# Patient Record
Sex: MALE | Race: BLACK OR AFRICAN AMERICAN | NOT HISPANIC OR LATINO | URBAN - METROPOLITAN AREA
[De-identification: names, ages, dates, MRNs, and addresses within clinical notes are randomized per-mention and may not be internally consistent; named-entity substitution may affect disease eponyms.]

---

## 2021-12-15 ENCOUNTER — INPATIENT (INPATIENT)
Facility: HOSPITAL | Age: 51
LOS: 1 days | Discharge: HOME | End: 2021-12-17
Attending: HOSPITALIST | Admitting: HOSPITALIST
Payer: MEDICAID

## 2021-12-15 VITALS
HEART RATE: 109 BPM | RESPIRATION RATE: 20 BRPM | TEMPERATURE: 98 F | SYSTOLIC BLOOD PRESSURE: 136 MMHG | DIASTOLIC BLOOD PRESSURE: 100 MMHG | OXYGEN SATURATION: 96 %

## 2021-12-15 DIAGNOSIS — K52.9 NONINFECTIVE GASTROENTERITIS AND COLITIS, UNSPECIFIED: ICD-10-CM

## 2021-12-15 LAB
ALBUMIN SERPL ELPH-MCNC: 4.2 G/DL — SIGNIFICANT CHANGE UP (ref 3.3–5)
ALBUMIN SERPL ELPH-MCNC: 4.8 G/DL — SIGNIFICANT CHANGE UP (ref 3.3–5)
ALP SERPL-CCNC: 103 U/L — SIGNIFICANT CHANGE UP (ref 40–120)
ALP SERPL-CCNC: 88 U/L — SIGNIFICANT CHANGE UP (ref 40–120)
ALT FLD-CCNC: 25 U/L — SIGNIFICANT CHANGE UP (ref 4–41)
ALT FLD-CCNC: 29 U/L — SIGNIFICANT CHANGE UP (ref 4–41)
ANION GAP SERPL CALC-SCNC: 14 MMOL/L — SIGNIFICANT CHANGE UP (ref 7–14)
ANION GAP SERPL CALC-SCNC: 17 MMOL/L — HIGH (ref 7–14)
ANISOCYTOSIS BLD QL: SLIGHT — SIGNIFICANT CHANGE UP
APTT BLD: 39.4 SEC — HIGH (ref 27–36.3)
AST SERPL-CCNC: 20 U/L — SIGNIFICANT CHANGE UP (ref 4–40)
AST SERPL-CCNC: 27 U/L — SIGNIFICANT CHANGE UP (ref 4–40)
BASE EXCESS BLDV CALC-SCNC: -15 MMOL/L — LOW (ref -2–3)
BASOPHILS # BLD AUTO: 0.1 K/UL — SIGNIFICANT CHANGE UP (ref 0–0.2)
BASOPHILS NFR BLD AUTO: 1.2 % — SIGNIFICANT CHANGE UP (ref 0–2)
BILIRUB SERPL-MCNC: 0.3 MG/DL — SIGNIFICANT CHANGE UP (ref 0.2–1.2)
BILIRUB SERPL-MCNC: 0.3 MG/DL — SIGNIFICANT CHANGE UP (ref 0.2–1.2)
BLOOD GAS VENOUS COMPREHENSIVE RESULT: SIGNIFICANT CHANGE UP
BUN SERPL-MCNC: 31 MG/DL — HIGH (ref 7–23)
BUN SERPL-MCNC: 39 MG/DL — HIGH (ref 7–23)
CALCIUM SERPL-MCNC: 8.6 MG/DL — SIGNIFICANT CHANGE UP (ref 8.4–10.5)
CALCIUM SERPL-MCNC: 8.8 MG/DL — SIGNIFICANT CHANGE UP (ref 8.4–10.5)
CHLORIDE BLDV-SCNC: 104 MMOL/L — SIGNIFICANT CHANGE UP (ref 96–108)
CHLORIDE SERPL-SCNC: 101 MMOL/L — SIGNIFICANT CHANGE UP (ref 98–107)
CHLORIDE SERPL-SCNC: 104 MMOL/L — SIGNIFICANT CHANGE UP (ref 98–107)
CO2 BLDV-SCNC: 13.9 MMOL/L — LOW (ref 22–26)
CO2 SERPL-SCNC: 10 MMOL/L — CRITICAL LOW (ref 22–31)
CO2 SERPL-SCNC: 18 MMOL/L — LOW (ref 22–31)
CREAT SERPL-MCNC: 1.62 MG/DL — HIGH (ref 0.5–1.3)
CREAT SERPL-MCNC: 1.89 MG/DL — HIGH (ref 0.5–1.3)
EOSINOPHIL # BLD AUTO: 0 K/UL — SIGNIFICANT CHANGE UP (ref 0–0.5)
EOSINOPHIL NFR BLD AUTO: 0 % — SIGNIFICANT CHANGE UP (ref 0–6)
GAS PNL BLDV: 129 MMOL/L — LOW (ref 136–145)
GAS PNL BLDV: SIGNIFICANT CHANGE UP
GIANT PLATELETS BLD QL SMEAR: PRESENT — SIGNIFICANT CHANGE UP
GLUCOSE BLDV-MCNC: 126 MG/DL — HIGH (ref 70–99)
GLUCOSE SERPL-MCNC: 128 MG/DL — HIGH (ref 70–99)
GLUCOSE SERPL-MCNC: 133 MG/DL — HIGH (ref 70–99)
HCO3 BLDV-SCNC: 13 MMOL/L — LOW (ref 22–29)
HCT VFR BLD CALC: 46.7 % — SIGNIFICANT CHANGE UP (ref 39–50)
HCT VFR BLD CALC: 51.3 % — HIGH (ref 39–50)
HCT VFR BLDA CALC: 51 % — SIGNIFICANT CHANGE UP (ref 39–51)
HGB BLD CALC-MCNC: 16.9 G/DL — SIGNIFICANT CHANGE UP (ref 13–17)
HGB BLD-MCNC: 15.3 G/DL — SIGNIFICANT CHANGE UP (ref 13–17)
HGB BLD-MCNC: 16.3 G/DL — SIGNIFICANT CHANGE UP (ref 13–17)
HIV 1+2 AB+HIV1 P24 AG SERPL QL IA: SIGNIFICANT CHANGE UP
IANC: 4.22 K/UL — SIGNIFICANT CHANGE UP (ref 1.5–8.5)
INR BLD: 1.35 RATIO — HIGH (ref 0.88–1.16)
LACTATE BLDV-MCNC: 1.7 MMOL/L — SIGNIFICANT CHANGE UP (ref 0.5–2)
LYMPHOCYTES # BLD AUTO: 0.74 K/UL — LOW (ref 1–3.3)
LYMPHOCYTES # BLD AUTO: 9.2 % — LOW (ref 13–44)
MAGNESIUM SERPL-MCNC: 2.1 MG/DL — SIGNIFICANT CHANGE UP (ref 1.6–2.6)
MANUAL SMEAR VERIFICATION: SIGNIFICANT CHANGE UP
MCHC RBC-ENTMCNC: 23.7 PG — LOW (ref 27–34)
MCHC RBC-ENTMCNC: 24.1 PG — LOW (ref 27–34)
MCHC RBC-ENTMCNC: 31.8 GM/DL — LOW (ref 32–36)
MCHC RBC-ENTMCNC: 32.8 GM/DL — SIGNIFICANT CHANGE UP (ref 32–36)
MCV RBC AUTO: 73.4 FL — LOW (ref 80–100)
MCV RBC AUTO: 74.5 FL — LOW (ref 80–100)
MONOCYTES # BLD AUTO: 1.11 K/UL — HIGH (ref 0–0.9)
MONOCYTES NFR BLD AUTO: 13.8 % — SIGNIFICANT CHANGE UP (ref 2–14)
NEUTROPHILS # BLD AUTO: 4.33 K/UL — SIGNIFICANT CHANGE UP (ref 1.8–7.4)
NEUTROPHILS NFR BLD AUTO: 54 % — SIGNIFICANT CHANGE UP (ref 43–77)
NRBC # BLD: 0 /100 WBCS — SIGNIFICANT CHANGE UP
NRBC # FLD: 0 K/UL — SIGNIFICANT CHANGE UP
PCO2 BLDV: 36 MMHG — LOW (ref 42–55)
PH BLDV: 7.16 — LOW (ref 7.32–7.43)
PLAT MORPH BLD: NORMAL — SIGNIFICANT CHANGE UP
PLATELET # BLD AUTO: 318 K/UL — SIGNIFICANT CHANGE UP (ref 150–400)
PLATELET # BLD AUTO: 364 K/UL — SIGNIFICANT CHANGE UP (ref 150–400)
PLATELET COUNT - ESTIMATE: NORMAL — SIGNIFICANT CHANGE UP
PO2 BLDV: 44 MMHG — SIGNIFICANT CHANGE UP
POIKILOCYTOSIS BLD QL AUTO: SLIGHT — SIGNIFICANT CHANGE UP
POLYCHROMASIA BLD QL SMEAR: SLIGHT — SIGNIFICANT CHANGE UP
POTASSIUM BLDV-SCNC: 3.8 MMOL/L — SIGNIFICANT CHANGE UP (ref 3.5–5.1)
POTASSIUM SERPL-MCNC: 3.9 MMOL/L — SIGNIFICANT CHANGE UP (ref 3.5–5.3)
POTASSIUM SERPL-MCNC: 4 MMOL/L — SIGNIFICANT CHANGE UP (ref 3.5–5.3)
POTASSIUM SERPL-SCNC: 3.9 MMOL/L — SIGNIFICANT CHANGE UP (ref 3.5–5.3)
POTASSIUM SERPL-SCNC: 4 MMOL/L — SIGNIFICANT CHANGE UP (ref 3.5–5.3)
PROT SERPL-MCNC: 7.3 G/DL — SIGNIFICANT CHANGE UP (ref 6–8.3)
PROT SERPL-MCNC: 8.9 G/DL — HIGH (ref 6–8.3)
PROTHROM AB SERPL-ACNC: 15.3 SEC — HIGH (ref 10.6–13.6)
RBC # BLD: 6.36 M/UL — HIGH (ref 4.2–5.8)
RBC # BLD: 6.89 M/UL — HIGH (ref 4.2–5.8)
RBC # FLD: 17.9 % — HIGH (ref 10.3–14.5)
RBC # FLD: 18.2 % — HIGH (ref 10.3–14.5)
RBC BLD AUTO: ABNORMAL
SAO2 % BLDV: 77.6 % — SIGNIFICANT CHANGE UP
SARS-COV-2 RNA SPEC QL NAA+PROBE: SIGNIFICANT CHANGE UP
SMUDGE CELLS # BLD: PRESENT — SIGNIFICANT CHANGE UP
SODIUM SERPL-SCNC: 131 MMOL/L — LOW (ref 135–145)
SODIUM SERPL-SCNC: 133 MMOL/L — LOW (ref 135–145)
VARIANT LYMPHS # BLD: 21.8 % — HIGH (ref 0–6)
WBC # BLD: 6.71 K/UL — SIGNIFICANT CHANGE UP (ref 3.8–10.5)
WBC # BLD: 8.01 K/UL — SIGNIFICANT CHANGE UP (ref 3.8–10.5)
WBC # FLD AUTO: 6.71 K/UL — SIGNIFICANT CHANGE UP (ref 3.8–10.5)
WBC # FLD AUTO: 8.01 K/UL — SIGNIFICANT CHANGE UP (ref 3.8–10.5)

## 2021-12-15 PROCEDURE — 99291 CRITICAL CARE FIRST HOUR: CPT

## 2021-12-15 PROCEDURE — 99223 1ST HOSP IP/OBS HIGH 75: CPT

## 2021-12-15 RX ORDER — CARVEDILOL PHOSPHATE 80 MG/1
1 CAPSULE, EXTENDED RELEASE ORAL
Qty: 0 | Refills: 0 | DISCHARGE

## 2021-12-15 RX ORDER — ATORVASTATIN CALCIUM 80 MG/1
20 TABLET, FILM COATED ORAL AT BEDTIME
Refills: 0 | Status: DISCONTINUED | OUTPATIENT
Start: 2021-12-15 | End: 2021-12-17

## 2021-12-15 RX ORDER — ACETAMINOPHEN 500 MG
650 TABLET ORAL EVERY 6 HOURS
Refills: 0 | Status: DISCONTINUED | OUTPATIENT
Start: 2021-12-15 | End: 2021-12-17

## 2021-12-15 RX ORDER — SODIUM CHLORIDE 9 MG/ML
1000 INJECTION INTRAMUSCULAR; INTRAVENOUS; SUBCUTANEOUS ONCE
Refills: 0 | Status: COMPLETED | OUTPATIENT
Start: 2021-12-15 | End: 2021-12-15

## 2021-12-15 RX ORDER — SODIUM CHLORIDE 9 MG/ML
500 INJECTION INTRAMUSCULAR; INTRAVENOUS; SUBCUTANEOUS ONCE
Refills: 0 | Status: COMPLETED | OUTPATIENT
Start: 2021-12-15 | End: 2021-12-15

## 2021-12-15 RX ORDER — ROSUVASTATIN CALCIUM 5 MG/1
1 TABLET ORAL
Qty: 0 | Refills: 0 | DISCHARGE

## 2021-12-15 RX ORDER — SPIRONOLACTONE 25 MG/1
1 TABLET, FILM COATED ORAL
Qty: 0 | Refills: 0 | DISCHARGE

## 2021-12-15 RX ORDER — ONDANSETRON 8 MG/1
4 TABLET, FILM COATED ORAL EVERY 8 HOURS
Refills: 0 | Status: DISCONTINUED | OUTPATIENT
Start: 2021-12-15 | End: 2021-12-17

## 2021-12-15 RX ORDER — LISINOPRIL 2.5 MG/1
1 TABLET ORAL
Qty: 0 | Refills: 0 | DISCHARGE

## 2021-12-15 RX ORDER — SODIUM BICARBONATE 1 MEQ/ML
0.23 SYRINGE (ML) INTRAVENOUS
Qty: 150 | Refills: 0 | Status: DISCONTINUED | OUTPATIENT
Start: 2021-12-15 | End: 2021-12-16

## 2021-12-15 RX ADMIN — Medication 150 MEQ/KG/HR: at 22:09

## 2021-12-15 RX ADMIN — Medication 150 MEQ/KG/HR: at 08:43

## 2021-12-15 RX ADMIN — SODIUM CHLORIDE 500 MILLILITER(S): 9 INJECTION INTRAMUSCULAR; INTRAVENOUS; SUBCUTANEOUS at 05:21

## 2021-12-15 RX ADMIN — SODIUM CHLORIDE 1000 MILLILITER(S): 9 INJECTION INTRAMUSCULAR; INTRAVENOUS; SUBCUTANEOUS at 07:14

## 2021-12-15 RX ADMIN — ATORVASTATIN CALCIUM 20 MILLIGRAM(S): 80 TABLET, FILM COATED ORAL at 21:25

## 2021-12-15 NOTE — ED ADULT NURSE REASSESSMENT NOTE - NS ED NURSE REASSESS COMMENT FT1
Pt received from night shift RN. Pt A/Ox4. Pt resting comfortably in stretcher. Respirations even and unlabored. Pt has not had an episode of diarrhea since admission. Abdomen nontender, nondistended, active bowel sounds heard on all quadrants.  Pt denies abdominal pain. Vital sings reassessed as noted. Awaiting stool sample. Will continue to monitor. Pt received from night shift RN. Pt A/Ox4. Pt resting comfortably in stretcher. Respirations even and unlabored. Pt has not had an episode of diarrhea since admission. Abdomen nontender, nondistended, active bowel sounds heard on all quadrants.  Pt denies abdominal pain. 20G IV placed on RAC. Vital signs reassessed as noted. Medications gives per MD order. Awaiting stool sample. Will continue to monitor. Pt received from night shift RN. Pt A/Ox4. Pt resting comfortably in stretcher. Respirations even and unlabored. Pt has not had an episode of diarrhea since admission. Abdomen nontender, nondistended, active bowel sounds heard on all quadrants.  Pt denies abdominal pain. 20G IV placed on RAC. Vital signs reassessed as noted. Medications given per MD order. Awaiting stool sample. Will continue to monitor.

## 2021-12-15 NOTE — H&P ADULT - ASSESSMENT
51yoM p/w diarrhea x 3 days, liquid stools, over 10 per day. PMH notable for a hospitalization in 2018 for pneumonia,; HTN, morbid obesity. He has also been told that he has heart failure, however he is unclear about this-- states he saw his Cardiologist 2 months ago and was told that he does not have HF. Currently takes coreg 25 mg BID, spironolactone 25 mg daily, lisinopril 40 mg daily which he has been adherent with prior to admission. Found to have high AG metabolic acidosis believed secondary to GI losses of bicarb in setting of diarrhea and was started on a bicarb gtt. GIP/ c diff pending.     # AG metabolic acidosis   # Diarrhea  - F/u repeat BMP, mag, CBC  - Cont bicarb gtt at 125cc/hr while awaiting labs  - Infectious work up pending: c.diff/GIP/covid/HIV    # HTN  With possible HFpEF? Patient doesn't know his exact diagnosis.  - Hold lisinopril/coreg in the setting of hypovolemia due to acute diarrhea  - Hold spironolactone in setting of diarrhea and electrolyte derangements  - Can likely resume these meds in am    # HLD  - Cont rosuvastatin 5 mg daily    # Not vaccinated against COVID19  Attempted to  patient on importance of COVID vaccination. Needs to be readdressed on discharge as he has multiple high risk factors (obesity, HTN) and lives with roommates.    # PPX  - SCDs only

## 2021-12-15 NOTE — H&P ADULT - NSHPLABSRESULTS_GEN_ALL_CORE
LABS:                        16.3   8.01  )-----------( 364      ( 15 Dec 2021 05:58 )             51.3     12-15    131<L>  |  104  |  39<H>  ----------------------------<  133<H>  4.0   |  10<LL>  |  1.89<H>    Ca    8.8      15 Dec 2021 05:58    TPro  8.9<H>  /  Alb  4.8  /  TBili  0.3  /  DBili  x   /  AST  27  /  ALT  29  /  AlkPhos  103  12-15              RADIOLOGY & ADDITIONAL TESTS:  no imaging done in ED

## 2021-12-15 NOTE — ED PROVIDER NOTE - OBJECTIVE STATEMENT
Patient is a 50 y/o M with PMHx of HTN who presents to the ED with 3 days of diarrhea. He states that since Sunday, he has been experiencing 10 loose bowel movements per day. Initially, his BM were formed but became loose and orange in color. Starting Monday, he endorses having black loose stools consistent with melena. Yesterday, he had two episodes on NBNB vomiting and mild left upper quadrant abdominal tenderness. He has had decreased PO intake, although he can tolerate food and liquids. He denies recent antibiotic use. He denies CP, SOB, fever, chills, nausea,  changes, sick contacts, or recent changes in diet. He took Pepto-Bismol which did not alleviate his symptoms.

## 2021-12-15 NOTE — H&P ADULT - HISTORY OF PRESENT ILLNESS
51yoM p/w diarrhea x 3 days, liquid stools, over 10 per day. PMH notable for a hospitalization in 2018 for pneumonia,; HTN, morbid obesity. He has also been told that he has heart failure, however he is unclear about this-- states he saw his Cardiologist 2 months ago and was told that he does not have HF. Currently takes coreg 25 mg BID, spironalactone 25 mg daily, lisinopril 40 mg daily which he has been adherent with. Denies any fevers, chills, sweats, weight loss/gain, chest pain, SOB, RAMIREZ, dysuria, hematuria, frothy urine, abdominal pain. Has been taking peptobismol  the past 2 days and since he started doing this he has noted that his stools have been black. Up to that point they were yellow brown. Had 1-12 episodes of vomiting that has since resolved. No stools since he has been in the ED. No recent hx of antibiotic use. Has NOT been vaccinated against covid 19- doesn't believe that the vaccine is helping anyone. "People still get covid even if they've been vaccinated."    ED vitals: T 97.8F, , /100, RR 20, 96% on RA    CBC notable for WBC 8k,  hgb 16.3, MCV 74.5, w mild lymphopenia.  COVID pcr pending. . K 4, chl 104, Co2 10, AG 17, BUn 39, Cr 1.89, gluc 133. alk phos 103, ast 27, alt 29. ph 7.16, pco2 36, po2 44, hco3 13. lactate 1.7    Recieved 2L NS boluses and was started on a bicarb gtt.

## 2021-12-15 NOTE — ED ADULT TRIAGE NOTE - CHIEF COMPLAINT QUOTE
Pt is c/o  several episodes of diarrhea since Sunday. c/o abdominal pain. vomited times. Denies fever. Feels weak. PMH of HTN

## 2021-12-15 NOTE — H&P ADULT - NSHPREVIEWOFSYSTEMS_GEN_ALL_CORE
Review of Systems:   CONSTITUTIONAL: No fever, no fatigue  EYES: No eye pain or discharge  ENMT:  No sinus or throat pain  NECK: No pain or stiffness  RESPIRATORY: No cough, wheezing, chills or hemoptysis; No shortness of breath  CARDIOVASCULAR: No chest pain, palpitations, dizziness, or leg swelling  GASTROINTESTINAL: No abdominal or epigastric pain.   GENITOURINARY: No dysuria or incontinence  MUSCULOSKELETAL: No joint pain or swelling; No muscle, back, or extremity pain  NEUROLOGICAL: No headaches, memory loss, loss of strength, numbness, or tremors  SKIN: No rashes, no skin changes

## 2021-12-15 NOTE — ED PROVIDER NOTE - NS ED ROS FT
GENERAL: No fever or chills  EYES: no change in vision  HEENT: no trouble swallowing or speaking  CARDIAC: no chest pain or palpiations   PULMONARY: no cough or SOB  GI: + abdominal pain, nausea, vomiting, diarrhea,   : No changes in urination  SKIN: no rashes  NEURO: no headache, numbness, or weakness.  MSK: No joint pain

## 2021-12-15 NOTE — PHARMACOTHERAPY INTERVENTION NOTE - COMMENTS
Medication history is incomplete. Medications verified with [facility]. Patient presents to ED with prescription bottles for carvedilol 25 mg, lisinopril 40 mg, and spironolactone 25 mg. Per pharmacy, patient also prescribed rosuvastatin 5 mg. Patient did not have prescription bottle for rosuvastatin and was unable to confirm whether he is taking this medication outpatient. Please call spectra [phone] if you have any questions.

## 2021-12-15 NOTE — H&P ADULT - NSHPPHYSICALEXAM_GEN_ALL_CORE
Vital Signs Last 24 Hrs  T(C): 36.5 (15 Dec 2021 06:59), Max: 36.6 (15 Dec 2021 04:05)  T(F): 97.7 (15 Dec 2021 06:59), Max: 97.8 (15 Dec 2021 04:05)  HR: 81 (15 Dec 2021 09:33) (81 - 109)  BP: 122/71 (15 Dec 2021 09:33) (111/77 - 136/100)  BP(mean): --  RR: 17 (15 Dec 2021 09:33) (17 - 20)  SpO2: 99% (15 Dec 2021 09:33) (96% - 100%)    CONSTITUTIONAL: Well-groomed, in mild distress, obese  EYES: No conjunctival or scleral injection, non-icteric;  ENMT: No external nasal lesions; nasal mucosa not inflamed; normal dentition; no pharyngeal injection or exudates, oral mucosa with moist membranes  NECK: Trachea midline without palpable neck mass; thyroid not enlarged and non-tender  RESPIRATORY: Breathing comfortably; no dullness to percussion; lungs CTA without wheeze/rhonchi/rales  CARDIOVASCULAR: +S1S2, RRR, no M/G/R; no carotid bruits; pedal pulses full and symmetric; no lower extremity edema  GASTROINTESTINAL: No palpable masses or tenderness, +BS throughout, no rebound/guarding; no hepatosplenomegaly  MUSCULOSKELETAL: No digital clubbing or cyanosis; no paraspinal tenderness; normal strength and tone of extremities  SKIN: No rashes or ulcers noted; no subcutaneous nodules or induration palpable  NEUROLOGIC: CN II-XII grossly intact  PSYCHIATRIC: A+O x 3; mood and affect appropriate; appropriate insight and judgment

## 2021-12-15 NOTE — ED ADULT NURSE NOTE - OBJECTIVE STATEMENT
Pt received to rm 13, A&OX4, ambulatory. C/o diarrhea for 6 days and two episodes of vomiting over the last two days. Denies blood in stool or emesis. C/o RUQ pain x1 day. Reportign about 10 loose stools per day, orange to brown in color. States stools became black with melena over the last 2 days. States he feels weak and tired, more than usual. Denies CP, SOB, palpitations, dizziness. 20G IV placed to  L AC. Will continue to monitor.

## 2021-12-15 NOTE — ED ADULT NURSE REASSESSMENT NOTE - NS ED NURSE REASSESS COMMENT FT1
Pt mental status at baseline. Pt is resting comfortably in stretcher, easily arousable to verbal stimuli. No apparent distress noted. Labs drawn and sent as per MD order. Will continue to monitor.

## 2021-12-15 NOTE — ED PROVIDER NOTE - CARE PLAN
1 Principal Discharge DX:	Severe diarrhea   Principal Discharge DX:	Metabolic acidosis  Secondary Diagnosis:	Severe diarrhea

## 2021-12-15 NOTE — ED PROVIDER NOTE - CLINICAL SUMMARY MEDICAL DECISION MAKING FREE TEXT BOX
Patient is a 50 y/o M with PMHx of HTN who presents to the ED with 3 days of diarrhea and melena. when assessing patient at bedside, he declined rectal exam to further evaluate patient.  HDS stable. Will elvin bolus of IV fluid, encourage PO intake, and F/U CBC and CMP.

## 2021-12-15 NOTE — ED PROVIDER NOTE - ATTENDING CONTRIBUTION TO CARE
50 yo with few days of NBNB NV and NB diarrhea.  There is some associated LUQ abd pain as well that is constant and does not radiate.  There have been no fevers or chills.  Decreased PO intake and some decreased UO.  Feels weak overall.  Diarrhea has turned mostly black recently.  No change in smell    Gen: Well appearing in NAD  Head: NC/AT  Neck: trachea midline  Resp:  No distress  Abd: soft NT ND   Ext: no deformities  Neuro:  A&O appears non focal  Skin:  Warm and dry as visualized  Psych:  Normal affect and mood     50 yo with NVD and abd pain.  Seems to be most consistent with a viral gastro however with the reported black stools will get a guiac to ensure not melena.  Will treat symptomatically with fluids and antiemetics.  No indication for imaging at this time.  Will dispo based on the results of labs and ability to tolerate PO 52 yo with few days of NBNB NV and NB diarrhea.  There is some associated LUQ abd pain as well that is constant and does not radiate.  There have been no fevers or chills.  Decreased PO intake and some decreased UO.  Feels weak overall.  Diarrhea has turned mostly black recently.  No change in smell    Gen: Well appearing in NAD  Head: NC/AT  Neck: trachea midline  Resp:  No distress  Abd: soft NT ND   Ext: no deformities  Neuro:  A&O appears non focal  Skin:  Warm and dry as visualized  Psych:  Normal affect and mood     52 yo with NVD and abd pain.  Seems to be most consistent with a viral gastro however with the reported black stools will get a guiac to ensure not melena.  Will treat symptomatically with fluids and antiemetics.  No indication for imaging at this time.  Will dispo based on the results of labs and ability to tolerate PO    Found to have significant metabolic acidosis from diarrhea.  Will need bicarb gtt and further resuscitation prior to admission    Upon my evaluation, this patient had a high probability of imminent or life-threatening deterioration due to severe metabolic acidosis, which required my direct attention, intervention, and personal management.  The patient has a  medical condition that impairs one or more vital organ systems.  Frequent personal assessment and adjustment of medical interventions was performed.      I have personally provided 45 minutes of critical care time exclusive of time spent on separately billable procedures. Time includes review of laboratory data, radiology results, discussion with consultants, patient and family; monitoring for potential decompensation, as well as time spent retrieving data and reviewing the chart and documenting the visit. Interventions were performed as documented above.

## 2021-12-16 DIAGNOSIS — N17.9 ACUTE KIDNEY FAILURE, UNSPECIFIED: ICD-10-CM

## 2021-12-16 DIAGNOSIS — E87.2 ACIDOSIS: ICD-10-CM

## 2021-12-16 DIAGNOSIS — R19.7 DIARRHEA, UNSPECIFIED: ICD-10-CM

## 2021-12-16 DIAGNOSIS — I10 ESSENTIAL (PRIMARY) HYPERTENSION: ICD-10-CM

## 2021-12-16 DIAGNOSIS — I50.32 CHRONIC DIASTOLIC (CONGESTIVE) HEART FAILURE: ICD-10-CM

## 2021-12-16 LAB
ANION GAP SERPL CALC-SCNC: 14 MMOL/L — SIGNIFICANT CHANGE UP (ref 7–14)
APPEARANCE UR: CLEAR — SIGNIFICANT CHANGE UP
BACTERIA # UR AUTO: NEGATIVE — SIGNIFICANT CHANGE UP
BILIRUB UR-MCNC: NEGATIVE — SIGNIFICANT CHANGE UP
BUN SERPL-MCNC: 25 MG/DL — HIGH (ref 7–23)
C DIFF BY PCR RESULT: SIGNIFICANT CHANGE UP
C DIFF TOX GENS STL QL NAA+PROBE: SIGNIFICANT CHANGE UP
CALCIUM SERPL-MCNC: 8.2 MG/DL — LOW (ref 8.4–10.5)
CHLORIDE SERPL-SCNC: 101 MMOL/L — SIGNIFICANT CHANGE UP (ref 98–107)
CO2 SERPL-SCNC: 20 MMOL/L — LOW (ref 22–31)
COLOR SPEC: YELLOW — SIGNIFICANT CHANGE UP
CREAT ?TM UR-MCNC: 161 MG/DL — SIGNIFICANT CHANGE UP
CREAT SERPL-MCNC: 1.31 MG/DL — HIGH (ref 0.5–1.3)
CULTURE RESULTS: SIGNIFICANT CHANGE UP
DIFF PNL FLD: NEGATIVE — SIGNIFICANT CHANGE UP
EPI CELLS # UR: 1 /HPF — SIGNIFICANT CHANGE UP (ref 0–5)
GLUCOSE SERPL-MCNC: 109 MG/DL — HIGH (ref 70–99)
GLUCOSE UR QL: NEGATIVE — SIGNIFICANT CHANGE UP
HCT VFR BLD CALC: 44 % — SIGNIFICANT CHANGE UP (ref 39–50)
HGB BLD-MCNC: 14.1 G/DL — SIGNIFICANT CHANGE UP (ref 13–17)
HYALINE CASTS # UR AUTO: 0 /LPF — SIGNIFICANT CHANGE UP (ref 0–7)
KETONES UR-MCNC: NEGATIVE — SIGNIFICANT CHANGE UP
LEUKOCYTE ESTERASE UR-ACNC: NEGATIVE — SIGNIFICANT CHANGE UP
MAGNESIUM SERPL-MCNC: 2 MG/DL — SIGNIFICANT CHANGE UP (ref 1.6–2.6)
MCHC RBC-ENTMCNC: 23.5 PG — LOW (ref 27–34)
MCHC RBC-ENTMCNC: 32 GM/DL — SIGNIFICANT CHANGE UP (ref 32–36)
MCV RBC AUTO: 73.2 FL — LOW (ref 80–100)
NITRITE UR-MCNC: NEGATIVE — SIGNIFICANT CHANGE UP
NRBC # BLD: 0 /100 WBCS — SIGNIFICANT CHANGE UP
NRBC # FLD: 0 K/UL — SIGNIFICANT CHANGE UP
PH UR: 6 — SIGNIFICANT CHANGE UP (ref 5–8)
PHOSPHATE SERPL-MCNC: 2.8 MG/DL — SIGNIFICANT CHANGE UP (ref 2.5–4.5)
PLATELET # BLD AUTO: 333 K/UL — SIGNIFICANT CHANGE UP (ref 150–400)
POTASSIUM SERPL-MCNC: 3.4 MMOL/L — LOW (ref 3.5–5.3)
POTASSIUM SERPL-SCNC: 3.4 MMOL/L — LOW (ref 3.5–5.3)
PROT ?TM UR-MCNC: 29 MG/DL — SIGNIFICANT CHANGE UP
PROT UR-MCNC: ABNORMAL
PROT/CREAT UR-RTO: 0.2 RATIO — SIGNIFICANT CHANGE UP (ref 0–0.2)
RBC # BLD: 6.01 M/UL — HIGH (ref 4.2–5.8)
RBC # FLD: 17.2 % — HIGH (ref 10.3–14.5)
RBC CASTS # UR COMP ASSIST: 3 /HPF — SIGNIFICANT CHANGE UP (ref 0–4)
SODIUM SERPL-SCNC: 135 MMOL/L — SIGNIFICANT CHANGE UP (ref 135–145)
SP GR SPEC: 1.02 — SIGNIFICANT CHANGE UP (ref 1–1.05)
SPECIMEN SOURCE: SIGNIFICANT CHANGE UP
UROBILINOGEN FLD QL: SIGNIFICANT CHANGE UP
WBC # BLD: 6.63 K/UL — SIGNIFICANT CHANGE UP (ref 3.8–10.5)
WBC # FLD AUTO: 6.63 K/UL — SIGNIFICANT CHANGE UP (ref 3.8–10.5)
WBC UR QL: 6 /HPF — HIGH (ref 0–5)

## 2021-12-16 PROCEDURE — 99233 SBSQ HOSP IP/OBS HIGH 50: CPT

## 2021-12-16 RX ORDER — POTASSIUM CHLORIDE 20 MEQ
40 PACKET (EA) ORAL ONCE
Refills: 0 | Status: COMPLETED | OUTPATIENT
Start: 2021-12-16 | End: 2021-12-16

## 2021-12-16 RX ORDER — LOPERAMIDE HCL 2 MG
2 TABLET ORAL THREE TIMES A DAY
Refills: 0 | Status: DISCONTINUED | OUTPATIENT
Start: 2021-12-16 | End: 2021-12-17

## 2021-12-16 RX ORDER — INFLUENZA VIRUS VACCINE 15; 15; 15; 15 UG/.5ML; UG/.5ML; UG/.5ML; UG/.5ML
0.5 SUSPENSION INTRAMUSCULAR ONCE
Refills: 0 | Status: DISCONTINUED | OUTPATIENT
Start: 2021-12-16 | End: 2021-12-17

## 2021-12-16 RX ORDER — LOPERAMIDE HCL 2 MG
4 TABLET ORAL ONCE
Refills: 0 | Status: COMPLETED | OUTPATIENT
Start: 2021-12-16 | End: 2021-12-16

## 2021-12-16 RX ORDER — SODIUM CHLORIDE 9 MG/ML
1000 INJECTION, SOLUTION INTRAVENOUS
Refills: 0 | Status: DISCONTINUED | OUTPATIENT
Start: 2021-12-16 | End: 2021-12-17

## 2021-12-16 RX ADMIN — SODIUM CHLORIDE 100 MILLILITER(S): 9 INJECTION, SOLUTION INTRAVENOUS at 11:33

## 2021-12-16 RX ADMIN — Medication 4 MILLIGRAM(S): at 16:32

## 2021-12-16 RX ADMIN — Medication 150 MEQ/KG/HR: at 06:46

## 2021-12-16 RX ADMIN — Medication 40 MILLIEQUIVALENT(S): at 13:26

## 2021-12-16 RX ADMIN — SODIUM CHLORIDE 100 MILLILITER(S): 9 INJECTION, SOLUTION INTRAVENOUS at 22:30

## 2021-12-16 RX ADMIN — ATORVASTATIN CALCIUM 20 MILLIGRAM(S): 80 TABLET, FILM COATED ORAL at 22:03

## 2021-12-16 NOTE — DISCHARGE NOTE PROVIDER - HOSPITAL COURSE
51yoM PMHx PNA, HTN, morbid obesity, ?heart failure (saw his Cardiologist 2 months ago and was told that he does not have HF) p/w diarrhea x 3 days, liquid stools, over 10 per day.  R/O infectious etiology c/b JUSTYNA, metabolic acidosis. Not vaccinated for covid-19.    AG metabolic acidosis /JUSTYNA likely 2/2 diarrhea, improved, s/p bicarb gtt, c/w LR for now, COVID negative, UA neg, HIV neg    Diarrhea due to rotavirus, cdiff  neg, stool cx pending, imodium started    HTN/Chronic diastolic congestive heart failure takes coreg 25 mg BID, spironolactone 25 mg daily, lisinopril 40 mg daily, coreg, lisinopril and spironolactone held due to JUSTYNA and normotensive status.    HLD rosuvastatin at home --> atorvastatin while in hospital     Dispo: home     On_________, case was discussed with Dr. Best, patient is medically cleared and optimized for discharge today. All medications were reviewed with attending, and sent to mutually agreed upon pharmacy.       51yoM PMHx PNA, HTN, morbid obesity, ?heart failure (saw his Cardiologist 2 months ago and was told that he does not have HF) p/w diarrhea x 3 days, liquid stools, over 10 per day.  R/O infectious etiology c/b JUSTYNA, metabolic acidosis. Not vaccinated for covid-19.    AG metabolic acidosis /JUSTYNA likely 2/2 diarrhea, improved, s/p bicarb gtt, c/w LR for now, COVID negative, UA neg, HIV neg    Diarrhea due to rotavirus, cdiff  neg, stool cx pending, imodium started    HTN/Chronic diastolic congestive heart failure takes coreg 25 mg BID, spironolactone 25 mg daily, lisinopril 40 mg daily,    HLD rosuvastatin at home --> atorvastatin while in hospital     Dispo: home, medically cleared and optimized for discharge today. All medications were reviewed with attending, and sent to mutually agreed upon pharmacy.       51yoM PMHx PNA, HTN, morbid obesity, ?heart failure (saw his Cardiologist 2 months ago and was told that he does not have HF) p/w diarrhea x 3 days, liquid stools, over 10 per day.  R/O infectious etiology c/b JUSTYNA, metabolic acidosis. Not vaccinated for covid-19.    AG metabolic acidosis /JUSTYNA likely 2/2 diarrhea, improved, s/p bicarb gtt, c/w LR for now, COVID negative, UA neg, HIV neg    Diarrhea due to rotavirus, cdiff  neg, stool cx pending, imodium started    HTN/Chronic diastolic congestive heart failure takes coreg 25 mg BID, spironolactone 25 mg daily, lisinopril 40 mg daily- on dc Lisinopril and Aldactone held, Coreg resumed as lower dose     HLD rosuvastatin at home --> atorvastatin while in hospital     Dispo: home, medically cleared and optimized for discharge today. All medications were reviewed with attending, and sent to mutually agreed upon pharmacy.

## 2021-12-16 NOTE — DISCHARGE NOTE PROVIDER - CARE PROVIDER_API CALL
PCP in 1 week,   Please discuss with your PCP about medication  Phone: (   )    -  Fax: (   )    -  Follow Up Time:

## 2021-12-16 NOTE — PROGRESS NOTE ADULT - SUBJECTIVE AND OBJECTIVE BOX
[facility] Division of Hospital Medicine  Jamari Best MD  Pager (M-F, 8A-5P): [phone]  Other Times:  [phone]    Patient is a 51y old  Male who presents with a chief complaint of diarrhea (15 Dec 2021 12:11)    SUBJECTIVE / OVERNIGHT EVENTS:  Down to two diarrheas this AM.  OVerall feeling better.  No F/C, N/V, CP, SOB, Cough, lightheadedness, dizziness, abdominal pain, diarrhea, dysuria.    MEDICATIONS  (STANDING):  atorvastatin 20 milliGRAM(s) Oral at bedtime  influenza   Vaccine 0.5 milliLiter(s) IntraMuscular once  lactated ringers. 1000 milliLiter(s) (100 mL/Hr) IV Continuous <Continuous>  potassium chloride    Tablet ER 40 milliEquivalent(s) Oral once    MEDICATIONS  (PRN):  acetaminophen     Tablet .. 650 milliGRAM(s) Oral every 6 hours PRN Temp greater or equal to 38C (100.4F), Mild Pain (1 - 3)  ondansetron Injectable 4 milliGRAM(s) IV Push every 8 hours PRN Nausea and/or Vomiting      Vital Signs Last 24 Hrs  T(C): 36.6 (16 Dec 2021 12:28), Max: 36.9 (16 Dec 2021 00:32)  T(F): 97.8 (16 Dec 2021 12:28), Max: 98.4 (16 Dec 2021 00:32)  HR: 82 (16 Dec 2021 12:28) (80 - 93)  BP: 115/75 (16 Dec 2021 12:28) (115/74 - 140/92)  BP(mean): --  RR: 17 (16 Dec 2021 12:28) (17 - 18)  SpO2: 100% (16 Dec 2021 12:28) (98% - 100%)  CAPILLARY BLOOD GLUCOSE        I&O's Summary    15 Dec 2021 07:01  -  16 Dec 2021 07:00  --------------------------------------------------------  IN: 0 mL / OUT: 900 mL / NET: -900 mL        PHYSICAL EXAM:  CONSTITUTIONAL: NAD, obese  EYES: PERRLA; conjunctiva and sclera clear  ENMT: Moist oral mucosa, no pharyngeal injection or exudates; normal dentition  NECK: Supple, no palpable masses; no thyromegaly  RESPIRATORY: Normal respiratory effort; lungs are clear to auscultation bilaterally  CARDIOVASCULAR: Regular rate and rhythm, normal S1 and S2, no murmur/rub/gallop; No lower extremity edema; Peripheral pulses are 2+ bilaterally  ABDOMEN: Nontender to palpation, normoactive bowel sounds, no rebound/guarding; No hepatosplenomegaly  MUSCULOSKELETAL:  Normal gait; no clubbing or cyanosis of digits; no joint swelling or tenderness to palpation  PSYCH: A+O to person, place, and time; affect appropriate  NEUROLOGY: CN 2-12 are intact and symmetric; no gross sensory deficits   SKIN: No rashes; no palpable lesions    LABS:                        14.1   6.63  )-----------( 333      ( 16 Dec 2021 07:06 )             44.0     12-16    135  |  101  |  25<H>  ----------------------------<  109<H>  3.4<L>   |  20<L>  |  1.31<H>    Ca    8.2<L>      16 Dec 2021 07:06  Phos  2.8     12-16  Mg     2.00     12-16    TPro  7.3  /  Alb  4.2  /  TBili  0.3  /  DBili  x   /  AST  20  /  ALT  25  /  AlkPhos  88  12-15    PT/INR - ( 15 Dec 2021 14:21 )   PT: 15.3 sec;   INR: 1.35 ratio         PTT - ( 15 Dec 2021 14:21 )  PTT:39.4 sec      Urinalysis Basic - ( 16 Dec 2021 02:22 )    Color: Yellow / Appearance: Clear / S.022 / pH: x  Gluc: x / Ketone: Negative  / Bili: Negative / Urobili: <2 mg/dL   Blood: x / Protein: 30 mg/dL / Nitrite: Negative   Leuk Esterase: Negative / RBC: 3 /HPF / WBC 6 /HPF   Sq Epi: x / Non Sq Epi: 1 /HPF / Bacteria: Negative        RADIOLOGY & ADDITIONAL TESTS:    Imaging Personally Reviewed:    Care Discussed with Consultants/Other Providers:   [facility] Division of Hospital Medicine  Jamari Best MD  Pager (M-F, 8A-5P): [phone]  Other Times:  [phone]    Patient is a 51y old  Male who presents with a chief complaint of diarrhea (15 Dec 2021 12:11)    SUBJECTIVE / OVERNIGHT EVENTS:  Down to two diarrheas this AM.  OVerall feeling better.  No F/C, N/V, CP, SOB, Cough, lightheadedness, dizziness, abdominal pain, diarrhea, dysuria.    MEDICATIONS  (STANDING):  atorvastatin 20 milliGRAM(s) Oral at bedtime  influenza   Vaccine 0.5 milliLiter(s) IntraMuscular once  lactated ringers. 1000 milliLiter(s) (100 mL/Hr) IV Continuous <Continuous>  potassium chloride    Tablet ER 40 milliEquivalent(s) Oral once    MEDICATIONS  (PRN):  acetaminophen     Tablet .. 650 milliGRAM(s) Oral every 6 hours PRN Temp greater or equal to 38C (100.4F), Mild Pain (1 - 3)  ondansetron Injectable 4 milliGRAM(s) IV Push every 8 hours PRN Nausea and/or Vomiting      Vital Signs Last 24 Hrs  T(C): 36.6 (16 Dec 2021 12:28), Max: 36.9 (16 Dec 2021 00:32)  T(F): 97.8 (16 Dec 2021 12:28), Max: 98.4 (16 Dec 2021 00:32)  HR: 82 (16 Dec 2021 12:28) (80 - 93)  BP: 115/75 (16 Dec 2021 12:28) (115/74 - 140/92)  BP(mean): --  RR: 17 (16 Dec 2021 12:28) (17 - 18)  SpO2: 100% (16 Dec 2021 12:28) (98% - 100%)  CAPILLARY BLOOD GLUCOSE        I&O's Summary    15 Dec 2021 07:01  -  16 Dec 2021 07:00  --------------------------------------------------------  IN: 0 mL / OUT: 900 mL / NET: -900 mL        PHYSICAL EXAM:  CONSTITUTIONAL: NAD, obese  EYES: PERRLA; conjunctiva and sclera clear  ENMT: Moist oral mucosa, no pharyngeal injection or exudates; normal dentition  NECK: Supple, no palpable masses; no thyromegaly  RESPIRATORY: Normal respiratory effort; lungs are clear to auscultation bilaterally  CARDIOVASCULAR: Regular rate and rhythm, normal S1 and S2, no murmur/rub/gallop; No lower extremity edema; Peripheral pulses are 2+ bilaterally  ABDOMEN: Nontender to palpation, normoactive bowel sounds, no rebound/guarding; No hepatosplenomegaly  MUSCULOSKELETAL:  Normal gait; no clubbing or cyanosis of digits; no joint swelling or tenderness to palpation  PSYCH: A+O to person, place, and time; affect appropriate  NEUROLOGY: CN 2-12 are intact and symmetric; no gross sensory deficits   SKIN: No rashes; no palpable lesions    LABS:                        14.1   6.63  )-----------( 333      ( 16 Dec 2021 07:06 )             44.0     12-16    135  |  101  |  25<H>  ----------------------------<  109<H>  3.4<L>   |  20<L>  |  1.31<H>    Ca    8.2<L>      16 Dec 2021 07:06  Phos  2.8     12-16  Mg     2.00     12-16    TPro  7.3  /  Alb  4.2  /  TBili  0.3  /  DBili  x   /  AST  20  /  ALT  25  /  AlkPhos  88  12-15    PT/INR - ( 15 Dec 2021 14:21 )   PT: 15.3 sec;   INR: 1.35 ratio         PTT - ( 15 Dec 2021 14:21 )  PTT:39.4 sec      Urinalysis Basic - ( 16 Dec 2021 02:22 )    Color: Yellow / Appearance: Clear / S.022 / pH: x  Gluc: x / Ketone: Negative  / Bili: Negative / Urobili: <2 mg/dL   Blood: x / Protein: 30 mg/dL / Nitrite: Negative   Leuk Esterase: Negative / RBC: 3 /HPF / WBC 6 /HPF   Sq Epi: x / Non Sq Epi: 1 /HPF / Bacteria: Negative    Rotavirus A   DETECTED by PCR     RADIOLOGY & ADDITIONAL TESTS:    Imaging Personally Reviewed:    Care Discussed with Consultants/Other Providers:

## 2021-12-16 NOTE — DISCHARGE NOTE PROVIDER - NSRESEARCHGRANT_OVERRIDEREC_GEN_A_CORE
Name: Chance Murphy   Sex: female   : 2008  
[address] [address] [address] 
[phone] (home) Current Immunizations: 
Immunization History Administered Date(s) Administered  DTaP 2008, 2008, 2008, 2010, 2012  Hep A Vaccine 2009, 2010  Hep B Vaccine 2008, 2008, 2009  
 Hib 2008, 2008, 2009, 10/06/2009  MMR 10/06/2009, 2012  Pneumococcal Conjugate (PCV-13) 2008, 2008, 2008, 2010, 2011  Poliovirus vaccine 2008, 2008, 2008, 2012  Rotavirus Vaccine 2008, 2008, 2008  Tdap 2019  Varicella Virus Vaccine 2009, 2012 Allergies: Allergies as of 2021  (No Known Allergies)
IMPROVE-DD Application Not Available

## 2021-12-16 NOTE — DISCHARGE NOTE PROVIDER - NSDCMRMEDTOKEN_GEN_ALL_CORE_FT
carvedilol 25 mg oral tablet: 1 tab(s) orally 2 times a day  lisinopril 40 mg oral tablet: 1 tab(s) orally once a day  rosuvastatin 5 mg oral tablet: 1 tab(s) orally once a day  spironolactone 25 mg oral tablet: 1 tab(s) orally once a day   acetaminophen 325 mg oral tablet: 2 tab(s) orally every 6 hours, As needed,   carvedilol 12.5 mg oral tablet: 1 tab(s) orally 2 times a day  loperamide 2 mg oral capsule: 1 cap(s) orally 3 times a day, As needed, Diarrhea  rosuvastatin 5 mg oral tablet: 1 tab(s) orally once a day

## 2021-12-16 NOTE — DISCHARGE NOTE PROVIDER - PROVIDER TOKENS
FREE:[LAST:[PCP in 1 week],PHONE:[(   )    -],FAX:[(   )    -],ADDRESS:[Please discuss with your PCP about medication]]

## 2021-12-16 NOTE — PROGRESS NOTE ADULT - PROBLEM SELECTOR PLAN 1
R/O infectious etiology  - Awaiting GI PCR  - if negative, discharge with OTC Immodium for home. Due to Rotavirus on GI PCR  - discharge with OTC Immodium for home.

## 2021-12-16 NOTE — DISCHARGE NOTE PROVIDER - NSDCCPCAREPLAN_GEN_ALL_CORE_FT
PRINCIPAL DISCHARGE DIAGNOSIS  Diagnosis: Metabolic acidosis  Assessment and Plan of Treatment: likely due to diarrhea, improved.  Repeat BMP within 1 week with your doctor      SECONDARY DISCHARGE DIAGNOSES  Diagnosis: Severe diarrhea  Assessment and Plan of Treatment: Diarrhea due to rotavirus,  imodium started       PRINCIPAL DISCHARGE DIAGNOSIS  Diagnosis: Metabolic acidosis  Assessment and Plan of Treatment: likely due to diarrhea, improved.  Repeat BMP within 1 week with your doctor      SECONDARY DISCHARGE DIAGNOSES  Diagnosis: Chronic diastolic congestive heart failure  Assessment and Plan of Treatment:     Diagnosis: Severe diarrhea  Assessment and Plan of Treatment: Diarrhea due to rotavirus,  may take imodium  as needed. Avoid dehydration     PRINCIPAL DISCHARGE DIAGNOSIS  Diagnosis: Metabolic acidosis  Assessment and Plan of Treatment: likely due to diarrhea, improved.  Repeat BMP within 1 week with your doctor      SECONDARY DISCHARGE DIAGNOSES  Diagnosis: Chronic diastolic congestive heart failure  Assessment and Plan of Treatment: Hold Lisinopril and Aldactone for now  Take lower dose of Coreg 12.5 2 x day   Low salt diet    Diagnosis: Severe diarrhea  Assessment and Plan of Treatment: Diarrhea due to rotavirus,  may take imodium  as needed. Avoid dehydration

## 2021-12-16 NOTE — PATIENT PROFILE ADULT - FALL HARM RISK - RISK INTERVENTIONS
Communicate Fall Risk and Risk Factors to all staff, patient, and family/Monitor gait and stability/Provide patient with walking aids - walker, cane, crutches/Reinforce activity limits and safety measures with patient and family/Visual Cue: Yellow wristband/Bed in lowest position, wheels locked, appropriate side rails in place/Call bell, personal items and telephone in reach/Instruct patient to call for assistance before getting out of bed or chair/Non-slip footwear when patient is out of bed/[place] to call system/Physically safe environment - no spills, clutter or unnecessary equipment/Purposeful Proactive Rounding/Room/bathroom lighting operational, light cord in reach

## 2021-12-17 VITALS
HEART RATE: 85 BPM | TEMPERATURE: 98 F | OXYGEN SATURATION: 100 % | RESPIRATION RATE: 18 BRPM | DIASTOLIC BLOOD PRESSURE: 83 MMHG | SYSTOLIC BLOOD PRESSURE: 139 MMHG

## 2021-12-17 LAB
ANION GAP SERPL CALC-SCNC: 11 MMOL/L — SIGNIFICANT CHANGE UP (ref 7–14)
BUN SERPL-MCNC: 14 MG/DL — SIGNIFICANT CHANGE UP (ref 7–23)
CALCIUM SERPL-MCNC: 8.5 MG/DL — SIGNIFICANT CHANGE UP (ref 8.4–10.5)
CHLORIDE SERPL-SCNC: 103 MMOL/L — SIGNIFICANT CHANGE UP (ref 98–107)
CO2 SERPL-SCNC: 24 MMOL/L — SIGNIFICANT CHANGE UP (ref 22–31)
CREAT SERPL-MCNC: 1.01 MG/DL — SIGNIFICANT CHANGE UP (ref 0.5–1.3)
GLUCOSE SERPL-MCNC: 132 MG/DL — HIGH (ref 70–99)
HCT VFR BLD CALC: 42.8 % — SIGNIFICANT CHANGE UP (ref 39–50)
HGB BLD-MCNC: 13.6 G/DL — SIGNIFICANT CHANGE UP (ref 13–17)
MAGNESIUM SERPL-MCNC: 1.9 MG/DL — SIGNIFICANT CHANGE UP (ref 1.6–2.6)
MCHC RBC-ENTMCNC: 23.4 PG — LOW (ref 27–34)
MCHC RBC-ENTMCNC: 31.8 GM/DL — LOW (ref 32–36)
MCV RBC AUTO: 73.8 FL — LOW (ref 80–100)
NRBC # BLD: 0 /100 WBCS — SIGNIFICANT CHANGE UP
NRBC # FLD: 0 K/UL — SIGNIFICANT CHANGE UP
PHOSPHATE SERPL-MCNC: 1.9 MG/DL — LOW (ref 2.5–4.5)
PLATELET # BLD AUTO: 312 K/UL — SIGNIFICANT CHANGE UP (ref 150–400)
POTASSIUM SERPL-MCNC: 3.3 MMOL/L — LOW (ref 3.5–5.3)
POTASSIUM SERPL-SCNC: 3.3 MMOL/L — LOW (ref 3.5–5.3)
RBC # BLD: 5.8 M/UL — SIGNIFICANT CHANGE UP (ref 4.2–5.8)
RBC # FLD: 16.5 % — HIGH (ref 10.3–14.5)
SODIUM SERPL-SCNC: 138 MMOL/L — SIGNIFICANT CHANGE UP (ref 135–145)
WBC # BLD: 6.89 K/UL — SIGNIFICANT CHANGE UP (ref 3.8–10.5)
WBC # FLD AUTO: 6.89 K/UL — SIGNIFICANT CHANGE UP (ref 3.8–10.5)

## 2021-12-17 PROCEDURE — 99239 HOSP IP/OBS DSCHRG MGMT >30: CPT

## 2021-12-17 RX ORDER — ACETAMINOPHEN 500 MG
2 TABLET ORAL
Qty: 0 | Refills: 0 | DISCHARGE
Start: 2021-12-17

## 2021-12-17 RX ORDER — LOPERAMIDE HCL 2 MG
1 TABLET ORAL
Qty: 15 | Refills: 0
Start: 2021-12-17 | End: 2021-12-21

## 2021-12-17 RX ORDER — SPIRONOLACTONE 25 MG/1
1 TABLET, FILM COATED ORAL
Qty: 0 | Refills: 0 | DISCHARGE

## 2021-12-17 RX ORDER — LISINOPRIL 2.5 MG/1
1 TABLET ORAL
Qty: 0 | Refills: 0 | DISCHARGE

## 2021-12-17 RX ORDER — CARVEDILOL PHOSPHATE 80 MG/1
1 CAPSULE, EXTENDED RELEASE ORAL
Qty: 0 | Refills: 0 | DISCHARGE

## 2021-12-17 RX ORDER — CARVEDILOL PHOSPHATE 80 MG/1
1 CAPSULE, EXTENDED RELEASE ORAL
Qty: 60 | Refills: 0
Start: 2021-12-17 | End: 2022-01-15

## 2021-12-17 NOTE — PROGRESS NOTE ADULT - ASSESSMENT
51M HTN, obesity, CHF, p/w diarrhea R/O infectious etiology c/b JUSTYNA, metabolic acidosis.
51M HTN, obesity, CHF, p/w diarrhea R/O infectious etiology c/b JUSTYNA, metabolic acidosis.

## 2021-12-17 NOTE — DISCHARGE NOTE NURSING/CASE MANAGEMENT/SOCIAL WORK - NSDCPEFALRISK_GEN_ALL_CORE
For information on Fall & Injury Prevention, visit: [email or web address] OR  [email or web address] OR  https://www.cdc.gov/steadi/patient.html

## 2021-12-17 NOTE — PROGRESS NOTE ADULT - PROBLEM/PLAN-2
Received a call from Ken from [facility]. She was requesting verbal orders for wvac changes. They will be changing patients wvac MWF. Provided VO for wound care. Ken will call if she has any further questions.  
DISPLAY PLAN FREE TEXT
DISPLAY PLAN FREE TEXT

## 2021-12-17 NOTE — PROGRESS NOTE ADULT - PROBLEM SELECTOR PLAN 4
Resume Coreg as outpt.
Coreg, lisinopril and spironolactone held due to JUSTYNA and normotensive status

## 2021-12-17 NOTE — PROGRESS NOTE ADULT - SUBJECTIVE AND OBJECTIVE BOX
[facility] Division of Hospital Medicine  Jamari Best MD  Pager (NAKIA-F, 8A-5P): [phone]  Other Times:  [phone]    Patient is a 51y old  Male who presents with a chief complaint of diarrhea (16 Dec 2021 19:00)    SUBJECTIVE / OVERNIGHT EVENTS:  Diarrhea improved with imodium.  No F/C, N/V, CP, SOB, Cough, lightheadedness, dizziness, abdominal pain, dysuria.    MEDICATIONS  (STANDING):  atorvastatin 20 milliGRAM(s) Oral at bedtime  influenza   Vaccine 0.5 milliLiter(s) IntraMuscular once  lactated ringers. 1000 milliLiter(s) (100 mL/Hr) IV Continuous <Continuous>    MEDICATIONS  (PRN):  acetaminophen     Tablet .. 650 milliGRAM(s) Oral every 6 hours PRN Temp greater or equal to 38C (100.4F), Mild Pain (1 - 3)  loperamide 2 milliGRAM(s) Oral three times a day PRN Diarrhea  ondansetron Injectable 4 milliGRAM(s) IV Push every 8 hours PRN Nausea and/or Vomiting      Vital Signs Last 24 Hrs  T(C): 36.7 (17 Dec 2021 12:24), Max: 36.8 (16 Dec 2021 20:03)  T(F): 98 (17 Dec 2021 12:24), Max: 98.3 (16 Dec 2021 20:03)  HR: 85 (17 Dec 2021 12:24) (82 - 85)  BP: 139/83 (17 Dec 2021 12:24) (122/78 - 139/83)  BP(mean): 100 (17 Dec 2021 12:24) (100 - 100)  RR: 18 (17 Dec 2021 12:24) (17 - 18)  SpO2: 100% (17 Dec 2021 12:24) (100% - 100%)  CAPILLARY BLOOD GLUCOSE        I&O's Summary      PHYSICAL EXAM:  CONSTITUTIONAL: NAD, obese  EYES: PERRLA; conjunctiva and sclera clear  ENMT: Moist oral mucosa, no pharyngeal injection or exudates; normal dentition  NECK: Supple, no palpable masses; no thyromegaly  RESPIRATORY: Normal respiratory effort; lungs are clear to auscultation bilaterally  CARDIOVASCULAR: Regular rate and rhythm, normal S1 and S2, no murmur/rub/gallop; No lower extremity edema; Peripheral pulses are 2+ bilaterally  ABDOMEN: Nontender to palpation, normoactive bowel sounds, no rebound/guarding; No hepatosplenomegaly  MUSCULOSKELETAL:  Normal gait; no clubbing or cyanosis of digits; no joint swelling or tenderness to palpation  PSYCH: A+O to person, place, and time; affect appropriate  NEUROLOGY: CN 2-12 are intact and symmetric; no gross sensory deficits   SKIN: No rashes; no palpable lesions    LABS:                        13.6   6.89  )-----------( 312      ( 17 Dec 2021 07:52 )             42.8         138  |  103  |  14  ----------------------------<  132<H>  3.3<L>   |  24  |  1.01    Ca    8.5      17 Dec 2021 07:52  Phos  1.9       Mg     1.90         TPro  7.3  /  Alb  4.2  /  TBili  0.3  /  DBili  x   /  AST  20  /  ALT  25  /  AlkPhos  88  12-15    PT/INR - ( 15 Dec 2021 14:21 )   PT: 15.3 sec;   INR: 1.35 ratio         PTT - ( 15 Dec 2021 14:21 )  PTT:39.4 sec      Urinalysis Basic - ( 16 Dec 2021 02:22 )    Color: Yellow / Appearance: Clear / S.022 / pH: x  Gluc: x / Ketone: Negative  / Bili: Negative / Urobili: <2 mg/dL   Blood: x / Protein: 30 mg/dL / Nitrite: Negative   Leuk Esterase: Negative / RBC: 3 /HPF / WBC 6 /HPF   Sq Epi: x / Non Sq Epi: 1 /HPF / Bacteria: Negative        RADIOLOGY & ADDITIONAL TESTS:    Imaging Personally Reviewed:    Care Discussed with Consultants/Other Providers:

## 2021-12-17 NOTE — DISCHARGE NOTE NURSING/CASE MANAGEMENT/SOCIAL WORK - PATIENT PORTAL LINK FT
You can access the FollowMyHealth Patient Portal offered by [facility] by registering at the following website: [email or web address]. By joining [employer]’s FollowMyHealth portal, you will also be able to view your health information using other applications (apps) compatible with our system.

## 2021-12-18 LAB
CULTURE RESULTS: SIGNIFICANT CHANGE UP
SPECIMEN SOURCE: SIGNIFICANT CHANGE UP

## 2021-12-20 LAB
CULTURE RESULTS: SIGNIFICANT CHANGE UP
SPECIMEN SOURCE: SIGNIFICANT CHANGE UP

## 2022-10-24 ENCOUNTER — EMERGENCY (EMERGENCY)
Facility: HOSPITAL | Age: 52
LOS: 0 days | Discharge: HOME | End: 2022-10-24
Attending: STUDENT IN AN ORGANIZED HEALTH CARE EDUCATION/TRAINING PROGRAM

## 2022-10-24 VITALS
HEART RATE: 68 BPM | SYSTOLIC BLOOD PRESSURE: 125 MMHG | DIASTOLIC BLOOD PRESSURE: 84 MMHG | OXYGEN SATURATION: 99 % | TEMPERATURE: 98 F | RESPIRATION RATE: 16 BRPM | WEIGHT: 279.99 LBS | HEIGHT: 69 IN

## 2022-10-24 VITALS
HEART RATE: 77 BPM | OXYGEN SATURATION: 100 % | SYSTOLIC BLOOD PRESSURE: 136 MMHG | TEMPERATURE: 98 F | DIASTOLIC BLOOD PRESSURE: 83 MMHG | RESPIRATION RATE: 18 BRPM

## 2022-10-24 DIAGNOSIS — M79.604 PAIN IN RIGHT LEG: ICD-10-CM

## 2022-10-24 DIAGNOSIS — Z88.5 ALLERGY STATUS TO NARCOTIC AGENT: ICD-10-CM

## 2022-10-24 DIAGNOSIS — M51.26 OTHER INTERVERTEBRAL DISC DISPLACEMENT, LUMBAR REGION: ICD-10-CM

## 2022-10-24 DIAGNOSIS — Z82.49 FAMILY HISTORY OF ISCHEMIC HEART DISEASE AND OTHER DISEASES OF THE CIRCULATORY SYSTEM: ICD-10-CM

## 2022-10-24 DIAGNOSIS — Z88.0 ALLERGY STATUS TO PENICILLIN: ICD-10-CM

## 2022-10-24 DIAGNOSIS — I10 ESSENTIAL (PRIMARY) HYPERTENSION: ICD-10-CM

## 2022-10-24 PROBLEM — J18.9 PNEUMONIA, UNSPECIFIED ORGANISM: Chronic | Status: ACTIVE | Noted: 2021-12-15

## 2022-10-24 LAB
APPEARANCE UR: CLEAR — SIGNIFICANT CHANGE UP
BACTERIA # UR AUTO: ABNORMAL
BILIRUB UR-MCNC: NEGATIVE — SIGNIFICANT CHANGE UP
COLOR SPEC: YELLOW — SIGNIFICANT CHANGE UP
DIFF PNL FLD: ABNORMAL
EPI CELLS # UR: SIGNIFICANT CHANGE UP
GLUCOSE UR QL: NEGATIVE MG/DL — SIGNIFICANT CHANGE UP
KETONES UR-MCNC: NEGATIVE — SIGNIFICANT CHANGE UP
LEUKOCYTE ESTERASE UR-ACNC: NEGATIVE — SIGNIFICANT CHANGE UP
NITRITE UR-MCNC: NEGATIVE — SIGNIFICANT CHANGE UP
PH UR: 6 — SIGNIFICANT CHANGE UP (ref 5–8)
PROT UR-MCNC: 30 MG/DL
RBC CASTS # UR COMP ASSIST: ABNORMAL /HPF (ref 0–4)
SP GR SPEC: 1.02 — SIGNIFICANT CHANGE UP (ref 1.01–1.02)
UROBILINOGEN FLD QL: NEGATIVE MG/DL — SIGNIFICANT CHANGE UP
WBC UR QL: SIGNIFICANT CHANGE UP

## 2022-10-24 PROCEDURE — 99284 EMERGENCY DEPT VISIT MOD MDM: CPT

## 2022-10-24 PROCEDURE — 72131 CT LUMBAR SPINE W/O DYE: CPT | Mod: 26,MC

## 2022-10-24 RX ORDER — METHOCARBAMOL 500 MG/1
1 TABLET, FILM COATED ORAL
Qty: 15 | Refills: 0
Start: 2022-10-24 | End: 2022-10-28

## 2022-10-24 RX ORDER — DEXAMETHASONE 0.5 MG/5ML
6 ELIXIR ORAL ONCE
Refills: 0 | Status: COMPLETED | OUTPATIENT
Start: 2022-10-24 | End: 2022-10-24

## 2022-10-24 RX ORDER — SPIRONOLACTONE 25 MG/1
1 TABLET, FILM COATED ORAL
Qty: 0 | Refills: 0 | DISCHARGE

## 2022-10-24 RX ORDER — LIDOCAINE 4 G/100G
1 CREAM TOPICAL ONCE
Refills: 0 | Status: COMPLETED | OUTPATIENT
Start: 2022-10-24 | End: 2022-10-24

## 2022-10-24 RX ORDER — METHOCARBAMOL 500 MG/1
750 TABLET, FILM COATED ORAL ONCE
Refills: 0 | Status: COMPLETED | OUTPATIENT
Start: 2022-10-24 | End: 2022-10-24

## 2022-10-24 RX ORDER — MELOXICAM 15 MG/1
1 TABLET ORAL
Qty: 7 | Refills: 0
Start: 2022-10-24 | End: 2022-10-30

## 2022-10-24 RX ORDER — KETOROLAC TROMETHAMINE 30 MG/ML
30 SYRINGE (ML) INJECTION ONCE
Refills: 0 | Status: DISCONTINUED | OUTPATIENT
Start: 2022-10-24 | End: 2022-10-24

## 2022-10-24 RX ORDER — IBUPROFEN 200 MG
1 TABLET ORAL
Qty: 30 | Refills: 0
Start: 2022-10-24 | End: 2022-10-30

## 2022-10-24 RX ORDER — OXYCODONE AND ACETAMINOPHEN 5; 325 MG/1; MG/1
1 TABLET ORAL
Qty: 5 | Refills: 0
Start: 2022-10-24 | End: 2022-10-26

## 2022-10-24 RX ORDER — LISINOPRIL 2.5 MG/1
1 TABLET ORAL
Qty: 0 | Refills: 0 | DISCHARGE

## 2022-10-24 RX ADMIN — METHOCARBAMOL 750 MILLIGRAM(S): 500 TABLET, FILM COATED ORAL at 16:52

## 2022-10-24 RX ADMIN — Medication 30 MILLIGRAM(S): at 16:51

## 2022-10-24 RX ADMIN — LIDOCAINE 1 PATCH: 4 CREAM TOPICAL at 16:52

## 2022-10-24 RX ADMIN — Medication 6 MILLIGRAM(S): at 19:04

## 2022-10-24 NOTE — ED ADULT NURSE NOTE - IS THE PATIENT ABLE TO BE SCREENED?
Yes

## 2022-10-24 NOTE — ED PROVIDER NOTE - PATIENT PORTAL LINK FT
You can access the FollowMyHealth Patient Portal offered by [facility] by registering at the following website: [email or web address]. By joining [employer]s FollowMyHealth portal, you will also be able to view your health information using other applications (apps) compatible with our system.

## 2022-10-24 NOTE — ED ADULT NURSE NOTE - NSIMPLEMENTINTERV_GEN_ALL_ED
4
Implemented All Universal Safety Interventions:  [place] to call system. Call bell, personal items and telephone within reach. Instruct patient to call for assistance. Room bathroom lighting operational. Non-slip footwear when patient is off stretcher. Physically safe environment: no spills, clutter or unnecessary equipment. Stretcher in lowest position, wheels locked, appropriate side rails in place.

## 2022-10-24 NOTE — ED PROVIDER NOTE - PROGRESS NOTE DETAILS
W/u significant for: CT w/ (+) very large herniated disc L4-5 w/ R canal / foraminal fragment, severe impingement. On re-eval, pt w/ improvement in symptoms s/p ED meds. Ambulatory in ED w/o difficulty. Stable for d/c home. Given scripts Toradol, Robaxin, Percocet, Medrol Dose Jasvir. Given and instructed for close outpatient Spinal f/u, MRI. Return signs / symptoms d/w pt at length. He understands / agrees w/ this plan.

## 2022-10-24 NOTE — ED ADULT TRIAGE NOTE - CHIEF COMPLAINT QUOTE
patient reports Right leg pain radiating to RLQ and Right flank while at work, states pain was so bad that he fell. denies urinary symptoms. states he lifts heavy boxes at work.  f/s 282 as per EMS.

## 2022-10-24 NOTE — ED PROVIDER NOTE - CARE PROVIDER_API CALL
Armani Schmidt (DO)  Orthopaedic Surgery Surgery  [address] [facility], [address] [place] [address]  Phone: [phone]  Fax: [phone]  Follow Up Time: 4-6 Days

## 2022-10-24 NOTE — ED PROVIDER NOTE - OBJECTIVE STATEMENT
52M PMH HTN, CAD pw RLE pain w/ rad into R lower back and groin. Pt reports present x wks, saw PMD, pending testing. Pain worsened, pt went to ED 1wk ago, told w/u negative, pt states had MR imaging and lab work done. Today at work, pt lifted heavy box, w/ recurrence of severe pain, pt fell to ground. Denies F/C, h/a, dizziness, CP, SOB, cough, N/V/D/C, UTI sx, bowel / bladder dysfunction, saddle paresthesias, numbness / weakness / tingling in UE / LE.     PMH as above, PSH none, Allergy morphine / PCN, meds as listed. 52M PMH HTN pw RLE pain w/ rad into R lower back and groin. Pt reports present x wks, saw PMD, pending testing. Pain worsened, pt went to ED 1wk ago, told w/u negative, pt states had MR imaging and lab work done. Today at work, pt lifted heavy box, w/ recurrence of severe pain, pt fell to ground. Denies F/C, h/a, dizziness, CP, SOB, cough, N/V/D/C, UTI sx, bowel / bladder dysfunction, saddle paresthesias, numbness / weakness / tingling in UE / LE.     PMH as above, PSH none, Allergy morphine / PCN, meds as listed. 52M PMH HTN pw RLE pain w/ rad into R lower back and R groin. Pt reports pain present x wks, saw his PMD and was pending testing. Pain worsened, pt went to ED 1wk ago, states he was told w/u negative, pt states had MR imaging and lab work done. Today at work, pt lifted heavy box, w/ recurrence of severe pain, pt fell to ground. Denies F/C, h/a, dizziness, CP, SOB, cough, N/V/D/C, UTI sx, bowel / bladder dysfunction, saddle paresthesias, numbness / weakness / tingling in UE / LE.     PMH as above, PSH none, Allergy morphine / PCN, meds as listed.

## 2022-10-24 NOTE — ED PROVIDER NOTE - PHYSICAL EXAMINATION
GEN: Awake, alert, interactive, NAD.  HEAD AND NECK: NC/AT. Airway patent. Neck supple.   EYES:  Clear b/l. EOMI. PERRL.   ENT: Moist mucus membranes.   CARDIAC: Regular rate, regular rhythm. No evident pedal edema.    RESP/CHEST: Normal respiratory effort with no use of accessory muscles or retractions. Clear throughout on auscultation.  ABD: Soft, non-distended, non-tender. No rebound, no guarding.   BACK: No midline spinal TTP. No CVAT. + SI joint TTP.   EXTREMITIES: Moving all extremities with no apparent deformities.   SKIN: Warm, dry, intact normal color. No rash.   NEURO: AOx3, CN II-XII grossly intact, no focal deficits.   PSYCH: Appropriate mood and affect.

## 2022-10-24 NOTE — ED ADULT NURSE NOTE - OBJECTIVE STATEMENT
patient reports Right leg pain radiating to RLQ and Right flank while at work, states pain was so bad that he fell. was lifting 30lb boxes, heavy  lifting and then pain began. pt states  unable to walk comfortably. denies urinary symptoms. no blood in urine. no head injury b

## 2022-10-24 NOTE — ED PROVIDER NOTE - CLINICAL SUMMARY MEDICAL DECISION MAKING FREE TEXT BOX
52M PMH HTN, CAD pw RLE pain w/ rad into R groin / low back worse s/p lifting heavy item today at work. AF, VSS. Well appearing, in NAD. Plan: CT lumbar, UA/C. Give Toradol, Robaxin, Lidoderm. Re-eval. 52M PMH HTN pw RLE pain w/ rad into R groin / low back worse s/p lifting heavy item today at work. AF, VSS. Well appearing, in NAD. Plan: CT lumbar, UA/C. Give Toradol, Robaxin, Lidoderm. Re-eval.

## 2022-10-26 LAB
CULTURE RESULTS: NO GROWTH — SIGNIFICANT CHANGE UP
SPECIMEN SOURCE: SIGNIFICANT CHANGE UP

## 2022-12-06 NOTE — ED ADULT TRIAGE NOTE - WEIGHT IN LBS
[address] EMERGENCY DEPARTMENT  [address] [facility] [address]  [phone]    Work/School Note    Date: 12/6/2022    To Whom It May concern:      Sandee Noel III was seen and treated today in the emergency room by the following provider(s):  Nurse Practitioner: Lenin Salgado NP. Holli Nguyễn is excused from work/school on 12/06/22. He is clear to return to work/school on 12/07/22.         Sincerely,          Staci Richardson NP
279.9

## 2023-01-31 ENCOUNTER — INPATIENT (INPATIENT)
Facility: HOSPITAL | Age: 53
LOS: 3 days | Discharge: HOME | DRG: 516 | End: 2023-02-04
Attending: STUDENT IN AN ORGANIZED HEALTH CARE EDUCATION/TRAINING PROGRAM | Admitting: STUDENT IN AN ORGANIZED HEALTH CARE EDUCATION/TRAINING PROGRAM
Payer: MEDICAID

## 2023-01-31 VITALS
TEMPERATURE: 98 F | OXYGEN SATURATION: 98 % | HEART RATE: 84 BPM | DIASTOLIC BLOOD PRESSURE: 78 MMHG | WEIGHT: 279.99 LBS | SYSTOLIC BLOOD PRESSURE: 136 MMHG | HEIGHT: 69 IN | RESPIRATION RATE: 18 BRPM

## 2023-01-31 DIAGNOSIS — R91.1 SOLITARY PULMONARY NODULE: ICD-10-CM

## 2023-01-31 DIAGNOSIS — Z29.9 ENCOUNTER FOR PROPHYLACTIC MEASURES, UNSPECIFIED: ICD-10-CM

## 2023-01-31 DIAGNOSIS — M54.16 RADICULOPATHY, LUMBAR REGION: ICD-10-CM

## 2023-01-31 DIAGNOSIS — D50.9 IRON DEFICIENCY ANEMIA, UNSPECIFIED: ICD-10-CM

## 2023-01-31 DIAGNOSIS — N18.9 CHRONIC KIDNEY DISEASE, UNSPECIFIED: ICD-10-CM

## 2023-01-31 DIAGNOSIS — R73.9 HYPERGLYCEMIA, UNSPECIFIED: ICD-10-CM

## 2023-01-31 DIAGNOSIS — I10 ESSENTIAL (PRIMARY) HYPERTENSION: ICD-10-CM

## 2023-01-31 LAB
ANION GAP SERPL CALC-SCNC: 7 MMOL/L — SIGNIFICANT CHANGE UP (ref 5–17)
APTT BLD: 33.6 SEC — SIGNIFICANT CHANGE UP (ref 27.5–35.5)
BUN SERPL-MCNC: 20 MG/DL — SIGNIFICANT CHANGE UP (ref 7–23)
CALCIUM SERPL-MCNC: 9.7 MG/DL — SIGNIFICANT CHANGE UP (ref 8.5–10.1)
CHLORIDE SERPL-SCNC: 103 MMOL/L — SIGNIFICANT CHANGE UP (ref 96–108)
CO2 SERPL-SCNC: 27 MMOL/L — SIGNIFICANT CHANGE UP (ref 22–31)
CREAT SERPL-MCNC: 1.5 MG/DL — HIGH (ref 0.5–1.3)
EGFR: 56 ML/MIN/1.73M2 — LOW
GLUCOSE SERPL-MCNC: 282 MG/DL — HIGH (ref 70–99)
HCT VFR BLD CALC: 42 % — SIGNIFICANT CHANGE UP (ref 39–50)
HGB BLD-MCNC: 12.9 G/DL — LOW (ref 13–17)
INR BLD: 1.08 RATIO — SIGNIFICANT CHANGE UP (ref 0.88–1.16)
MCHC RBC-ENTMCNC: 23.2 PG — LOW (ref 27–34)
MCHC RBC-ENTMCNC: 30.7 GM/DL — LOW (ref 32–36)
MCV RBC AUTO: 75.7 FL — LOW (ref 80–100)
NRBC # BLD: 0 /100 WBCS — SIGNIFICANT CHANGE UP (ref 0–0)
PLATELET # BLD AUTO: 311 K/UL — SIGNIFICANT CHANGE UP (ref 150–400)
POTASSIUM SERPL-MCNC: 4.6 MMOL/L — SIGNIFICANT CHANGE UP (ref 3.5–5.3)
POTASSIUM SERPL-SCNC: 4.6 MMOL/L — SIGNIFICANT CHANGE UP (ref 3.5–5.3)
PROTHROM AB SERPL-ACNC: 12.7 SEC — SIGNIFICANT CHANGE UP (ref 10.5–13.4)
RBC # BLD: 5.55 M/UL — SIGNIFICANT CHANGE UP (ref 4.2–5.8)
RBC # FLD: 17.2 % — HIGH (ref 10.3–14.5)
SARS-COV-2 RNA SPEC QL NAA+PROBE: SIGNIFICANT CHANGE UP
SODIUM SERPL-SCNC: 137 MMOL/L — SIGNIFICANT CHANGE UP (ref 135–145)
WBC # BLD: 7.71 K/UL — SIGNIFICANT CHANGE UP (ref 3.8–10.5)
WBC # FLD AUTO: 7.71 K/UL — SIGNIFICANT CHANGE UP (ref 3.8–10.5)

## 2023-01-31 PROCEDURE — 99223 1ST HOSP IP/OBS HIGH 75: CPT | Mod: GC

## 2023-01-31 PROCEDURE — 99285 EMERGENCY DEPT VISIT HI MDM: CPT

## 2023-01-31 PROCEDURE — 99223 1ST HOSP IP/OBS HIGH 75: CPT

## 2023-01-31 PROCEDURE — 71045 X-RAY EXAM CHEST 1 VIEW: CPT | Mod: 26

## 2023-01-31 PROCEDURE — 93010 ELECTROCARDIOGRAM REPORT: CPT

## 2023-01-31 RX ORDER — INSULIN LISPRO 100/ML
VIAL (ML) SUBCUTANEOUS AT BEDTIME
Refills: 0 | Status: DISCONTINUED | OUTPATIENT
Start: 2023-01-31 | End: 2023-02-01

## 2023-01-31 RX ORDER — LOPERAMIDE HCL 2 MG
2 TABLET ORAL ONCE
Refills: 0 | Status: DISCONTINUED | OUTPATIENT
Start: 2023-01-31 | End: 2023-02-01

## 2023-01-31 RX ORDER — ACETAMINOPHEN 500 MG
650 TABLET ORAL EVERY 6 HOURS
Refills: 0 | Status: DISCONTINUED | OUTPATIENT
Start: 2023-01-31 | End: 2023-02-01

## 2023-01-31 RX ORDER — SPIRONOLACTONE 25 MG/1
25 TABLET, FILM COATED ORAL DAILY
Refills: 0 | Status: DISCONTINUED | OUTPATIENT
Start: 2023-01-31 | End: 2023-02-01

## 2023-01-31 RX ORDER — LISINOPRIL 2.5 MG/1
20 TABLET ORAL DAILY
Refills: 0 | Status: DISCONTINUED | OUTPATIENT
Start: 2023-01-31 | End: 2023-02-01

## 2023-01-31 RX ORDER — DEXTROSE 50 % IN WATER 50 %
15 SYRINGE (ML) INTRAVENOUS ONCE
Refills: 0 | Status: DISCONTINUED | OUTPATIENT
Start: 2023-01-31 | End: 2023-02-01

## 2023-01-31 RX ORDER — INSULIN LISPRO 100/ML
VIAL (ML) SUBCUTANEOUS
Refills: 0 | Status: DISCONTINUED | OUTPATIENT
Start: 2023-01-31 | End: 2023-02-01

## 2023-01-31 RX ORDER — DEXTROSE 50 % IN WATER 50 %
25 SYRINGE (ML) INTRAVENOUS ONCE
Refills: 0 | Status: DISCONTINUED | OUTPATIENT
Start: 2023-01-31 | End: 2023-02-01

## 2023-01-31 RX ORDER — ATORVASTATIN CALCIUM 80 MG/1
20 TABLET, FILM COATED ORAL AT BEDTIME
Refills: 0 | Status: DISCONTINUED | OUTPATIENT
Start: 2023-01-31 | End: 2023-02-01

## 2023-01-31 RX ORDER — SODIUM CHLORIDE 9 MG/ML
1000 INJECTION, SOLUTION INTRAVENOUS
Refills: 0 | Status: DISCONTINUED | OUTPATIENT
Start: 2023-01-31 | End: 2023-02-01

## 2023-01-31 RX ORDER — SODIUM CHLORIDE 9 MG/ML
1000 INJECTION, SOLUTION INTRAVENOUS
Refills: 0 | Status: DISCONTINUED | OUTPATIENT
Start: 2023-02-01 | End: 2023-02-01

## 2023-01-31 RX ORDER — GLUCAGON INJECTION, SOLUTION 0.5 MG/.1ML
1 INJECTION, SOLUTION SUBCUTANEOUS ONCE
Refills: 0 | Status: DISCONTINUED | OUTPATIENT
Start: 2023-01-31 | End: 2023-02-01

## 2023-01-31 RX ORDER — OXYCODONE HYDROCHLORIDE 5 MG/1
5 TABLET ORAL EVERY 4 HOURS
Refills: 0 | Status: DISCONTINUED | OUTPATIENT
Start: 2023-01-31 | End: 2023-02-01

## 2023-01-31 RX ORDER — OXYCODONE HYDROCHLORIDE 5 MG/1
10 TABLET ORAL EVERY 4 HOURS
Refills: 0 | Status: DISCONTINUED | OUTPATIENT
Start: 2023-01-31 | End: 2023-02-01

## 2023-01-31 RX ORDER — CARVEDILOL PHOSPHATE 80 MG/1
12.5 CAPSULE, EXTENDED RELEASE ORAL EVERY 12 HOURS
Refills: 0 | Status: DISCONTINUED | OUTPATIENT
Start: 2023-01-31 | End: 2023-02-01

## 2023-01-31 RX ORDER — INFLUENZA VIRUS VACCINE 15; 15; 15; 15 UG/.5ML; UG/.5ML; UG/.5ML; UG/.5ML
0.5 SUSPENSION INTRAMUSCULAR ONCE
Refills: 0 | Status: DISCONTINUED | OUTPATIENT
Start: 2023-01-31 | End: 2023-02-04

## 2023-01-31 RX ORDER — DEXTROSE 50 % IN WATER 50 %
12.5 SYRINGE (ML) INTRAVENOUS ONCE
Refills: 0 | Status: DISCONTINUED | OUTPATIENT
Start: 2023-01-31 | End: 2023-02-01

## 2023-01-31 RX ADMIN — ATORVASTATIN CALCIUM 20 MILLIGRAM(S): 80 TABLET, FILM COATED ORAL at 22:16

## 2023-01-31 NOTE — ED PROVIDER NOTE - DISCUSSED CASE WITH MULTISELECT OPTIONS
Niacinamide Counseling: I recommended taking niacin or niacinamide, also know as vitamin B3, twice daily. Recent evidence suggests that taking vitamin B3 (500 mg twice daily) can reduce the risk of actinic keratoses and non-melanoma skin cancers. Side effects of vitamin B3 include flushing and headache. Admitting MD

## 2023-01-31 NOTE — ED ADULT TRIAGE NOTE - CHIEF COMPLAINT QUOTE
Patient A/OX4 with clear speech and a steady gait. Sent in by surgeon to have spinal surgery in the AM. Denies any complaints or concerns at this time. NAD noted.

## 2023-01-31 NOTE — ED PROVIDER NOTE - OBJECTIVE STATEMENT
53 yo M PMHx HTN, right sided lumbar radiculopathy presents to ED for surgery with Dr. Capone tomorrow- L4-5 Posterior spinal decompression. Pt reports chronic low back pain x nearly 20 yrs after a work injury. however states pain has been getting worse over the last several months. Pt reports pain right lumbar spine radiating into his RLE with occasional tingling. Pt denies abd pain, bladder/bowel incontinence, saddle anesthesia, chest pain, fever/chills.

## 2023-01-31 NOTE — CONSULT NOTE ADULT - PROBLEM SELECTOR RECOMMENDATION 9
Pt presents with right sided lumbar radiculopathy and is planned for L4-5 posterior spinal decompression  - no cord compression or cauda equina symptoms or any other red flag signs  - Plan for OR with Dr. Capone on 2/1 for spinal decompression  - NPO after midnight  - FU preop labs  - Hold chemical dvt prophylaxis  - Dr. Capone (surgery) following  - Dr. Lopez (cardio) for clearance    METS>4. Pt is able to go upstairs without getting pressure in his chest or develop SOB. He can carry groceries without developing chest pressure or sob as well. His father passed away at a young age but he didn't really know him and doesn't know his dad's medical history. Pt doesn't have any known valvular diseases or hx of any stent placement. Pt says he received an echo and stress test when he had pneumonia in 2018 but is unsure of the results of those. It was done at [facility]. Pt does not recall the name of the cardiologist. Pt denies having a smoking history. Pt denies ever having any surgical procedures. Pt presents with right sided lumbar radiculopathy and is planned for L4-5 posterior spinal decompression  - no cord compression or cauda equina symptoms or any other red flag signs  - Plan for OR with Dr. Capone on 2/1 for spinal decompression  - NPO after midnight  - FU preop labs  - Hold chemical dvt prophylaxis  - Dr. Capone (surgery) following  - Dr. Lopez (cardio) for clearance  - F/u echo    METS>4. Pt is able to go upstairs without getting pressure in his chest or develop SOB. He can carry groceries without developing chest pressure or sob as well. His father passed away at a young age but he didn't really know him and doesn't know his dad's medical history. Pt doesn't have any known valvular diseases or hx of any stent placement. Pt says he received an echo and stress test when he had pneumonia in 2018 but is unsure of the results of those. It was done at [facility]. Pt does not recall the name of the cardiologist. Pt denies having a smoking history. Pt denies ever having any surgical procedures.    Pt is medically optimized for procedure. F/u with cardiology for clearance.   Pt is moderate risk for moderate risk procedure. Pt presents with right sided lumbar radiculopathy and is planned for L4-5 posterior spinal decompression  - no cord compression or cauda equina symptoms or any other red flag signs  - Plan for OR with Dr. Capone on 2/1 for spinal decompression  - NPO after midnight  - FU preop labs  - Hold chemical dvt prophylaxis  - Dr. Capone (surgery) following  - Dr. Lopez (cardio) for clearance  - F/u echo    METS>4. Pt is able to go upstairs without getting pressure in his chest or develop SOB. He can carry groceries without developing chest pressure or sob as well. His father passed away at a young age but he didn't really know him and doesn't know his dad's medical history. Pt doesn't have any known valvular diseases or hx of any stent placement. Pt says he received an echo and stress test when he had pneumonia in 2018 but is unsure of the results of those. It was done at [facility]. Pt does not recall the name of the cardiologist. Pt denies having a smoking history. Pt denies ever having any surgical procedures or anesthesia.     Medical clearance pending cardiology clearance and echo read.  Pt is moderate risk for moderate risk procedure. Pt presents with right sided lumbar radiculopathy and is planned for L4-5 posterior spinal decompression  - no cord compression or cauda equina symptoms or any other red flag signs  - FU preop labs  - Hold chemical dvt prophylaxis  - Dr. Capone (surgery) following  - Dr. Lopez (cardio) for clearance  - F/u echo    METS>4. Pt is able to go upstairs without getting pressure in his chest or develop SOB. He can carry groceries without developing chest pressure or sob as well. His father passed away at a young age but he didn't really know him and doesn't know his dad's medical history. Pt doesn't have any known valvular diseases or hx of any stent placement. Pt says he received an echo and stress test when he had pneumonia in 2018 but is unsure of the results of those. It was done at [facility]. Pt does not recall the name of the cardiologist. Pt denies having a smoking history. Pt denies ever having any surgical procedures or anesthesia. Pt presents with right sided lumbar radiculopathy and is planned for L4-5 posterior spinal decompression  - no cord compression or cauda equina symptoms or any other red flag signs  - FU preop labs  - Hold chemical dvt prophylaxis  - Dr. Capone (surgery) following  - Dr. Lopez (cardio) for clearance  - F/u echo  -medical clearance pending results of TTE.  -cardiac clearance to be documented separately

## 2023-01-31 NOTE — CONSULT NOTE ADULT - SUBJECTIVE AND OBJECTIVE BOX
[facility] - Shruthi Cedillo, Jessica Huynh, Edwin Diamond Savella  Office Number: [phone]    Initial Consult Note    CHIEF COMPLAINT: Patient is a 52y old  Male who presents with a chief complaint of Lumbar Radiculopathy       HPI:  52 year old male with past medical history of CAP, obesity , chronic diastolic chf, htn presenting   for surgery scheduled for tomorrow.     PAST MEDICAL & SURGICAL HISTORY:  Hypertension      CAP (community acquired pneumonia)      No significant past surgical history      Vital Signs Last 24 Hrs  T(C): 36.7 (31 Jan 2023 13:31), Max: 36.7 (31 Jan 2023 13:31)  T(F): 98 (31 Jan 2023 13:31), Max: 98 (31 Jan 2023 13:31)  HR: 84 (31 Jan 2023 13:31) (84 - 84)  BP: 136/78 (31 Jan 2023 13:31) (136/78 - 136/78)  BP(mean): --  RR: 18 (31 Jan 2023 13:31) (18 - 18)  SpO2: 98% (31 Jan 2023 13:31) (98% - 98%)    Parameters below as of 31 Jan 2023 13:31  Patient On (Oxygen Delivery Method): room air      I&O's Detail      LABS:        PHYSICAL EXAM:  General; Awake and alert, Oriented x 3  Spine: No TTP over spinous processes or paraspinal muscles at C/T/L spine.   No palpable step off.   Able to actively SLR BL.   No pain to passive SLR   Ambulating w/o assistance             Motor exam:        C5       C6       C7       C8       T1   R  5/5      5/5     5/5     5/5      5/5  L   5/5      5/5     5/5     5/5      5/5         L2        L3       L4       L5      S1  R  5/5      5/5     5/5     5/5    4/5  L   5/5     5/5      5/5     5/5    5/5    Sensory:  (0=absent, 1=impaired, 2=normal, NT=not testable)      C5    C6   C7   C8   T1         R   2     2      2     2      2  L    2     2      2     2      2        L2    L3   L4   L5   S1   R   2     2     2     1     2  L    2     2     2     2     2        PAST MEDICAL & SURGICAL HISTORY:  Hypertension      CAP (community acquired pneumonia)      No significant past surgical history          SOCIAL HISTORY:  No tobacco, ethanol, or drug abuse.    FAMILY HISTORY:  FH: HTN (hypertension) (Father, Mother)      No family history of acute MI or sudden cardiac death.    MEDICATIONS  (STANDING):  atorvastatin 20 milliGRAM(s) Oral at bedtime  carvedilol 12.5 milliGRAM(s) Oral every 12 hours  lisinopril 20 milliGRAM(s) Oral daily  spironolactone 25 milliGRAM(s) Oral daily    MEDICATIONS  (PRN):  acetaminophen     Tablet .. 650 milliGRAM(s) Oral every 6 hours PRN Mild Pain (1 - 3)  loperamide 2 milliGRAM(s) Oral Once PRN Diarrhea  oxyCODONE    IR 10 milliGRAM(s) Oral every 4 hours PRN Severe Pain (7 - 10)  oxyCODONE    IR 5 milliGRAM(s) Oral every 4 hours PRN Moderate Pain (4 - 6)      Allergies    morphine (Unknown)  pain medicine, name unknown (Unknown)  penicillins (Unknown)    Intolerances        REVIEW OF SYSTEMS:  All other review of systems is negative unless indicated above    VITAL SIGNS:   Vital Signs Last 24 Hrs  T(C): 36.7 (31 Jan 2023 13:31), Max: 36.7 (31 Jan 2023 13:31)  T(F): 98 (31 Jan 2023 13:31), Max: 98 (31 Jan 2023 13:31)  HR: 84 (31 Jan 2023 13:31) (84 - 84)  BP: 136/78 (31 Jan 2023 13:31) (136/78 - 136/78)  BP(mean): --  RR: 18 (31 Jan 2023 13:31) (18 - 18)  SpO2: 98% (31 Jan 2023 13:31) (98% - 98%)    Parameters below as of 31 Jan 2023 13:31  Patient On (Oxygen Delivery Method): room air        I&O's Summary      On Exam:    Constitutional: NAD, alert and oriented x 3  Lungs:  Non-labored, breath sounds are clear bilaterally, No wheezing, rales or rhonchi  Cardiovascular: RRR.  S1 and S2 positive.  No murmurs, rubs, gallops or clicks  Gastrointestinal: Bowel Sounds present, soft, nontender.   Lymph: No peripheral edema. No cervical lymphadenopathy.  Neurological: Alert, no focal deficits  Skin: No rashes or ulcers   Psych:  Mood & affect appropriate.    LABS: All Labs Reviewed:                        12.9   7.71  )-----------( 311      ( 31 Jan 2023 14:10 )             42.0     31 Jan 2023 14:10    137    |  103    |  20     ----------------------------<  282    4.6     |  27     |  1.50     Ca    9.7        31 Jan 2023 14:10      PT/INR - ( 31 Jan 2023 14:10 )   PT: 12.7 sec;   INR: 1.08 ratio         PTT - ( 31 Jan 2023 14:10 )  PTT:33.6 sec      Blood Culture:         RADIOLOGY:    EKG:         [facility] - Shruthi Cedillo, Jessica Huynh, Edwin Diamond Savella  Office Number: [phone]    Initial Consult Note    CHIEF COMPLAINT: Patient is a 52y old  Male who presents with a chief complaint of Lumbar Radiculopathy       HPI:  52 year old male with past medical history of CAP, obesity , chronic diastolic chf, htn presenting   for surgery scheduled for tomorrow.  Reports he was told by his surgeon to come to ED for surgery scheduled for 2/1. He does not know his cardiologist name ,follows with [facility] had scheduled appt for tomorrow.   He is unclear on his history aside from htn. Denies chest pain dizziness palpitations dyspnea. No recent illness, n/v/d fever or chills.  Does report he had echo and stress test in past unsure where or what dates.     PAST MEDICAL & SURGICAL HISTORY:  Hypertension      CAP (community acquired pneumonia)      No significant past surgical history      Vital Signs Last 24 Hrs  T(C): 36.7 (31 Jan 2023 13:31), Max: 36.7 (31 Jan 2023 13:31)  T(F): 98 (31 Jan 2023 13:31), Max: 98 (31 Jan 2023 13:31)  HR: 84 (31 Jan 2023 13:31) (84 - 84)  BP: 136/78 (31 Jan 2023 13:31) (136/78 - 136/78)  BP(mean): --  RR: 18 (31 Jan 2023 13:31) (18 - 18)  SpO2: 98% (31 Jan 2023 13:31) (98% - 98%)    Parameters below as of 31 Jan 2023 13:31  Patient On (Oxygen Delivery Method): room air      I&O's Detail      LABS:                         PAST MEDICAL & SURGICAL HISTORY:  Hypertension      CAP (community acquired pneumonia)      No significant past surgical history          SOCIAL HISTORY:    denies   No tobacco, ethanol, or drug abuse.    FAMILY HISTORY:  FH: HTN (hypertension) (Father, Mother)      No family history of acute MI or sudden cardiac death.    MEDICATIONS  (STANDING):  atorvastatin 20 milliGRAM(s) Oral at bedtime  carvedilol 12.5 milliGRAM(s) Oral every 12 hours  lisinopril 20 milliGRAM(s) Oral daily  spironolactone 25 milliGRAM(s) Oral daily    MEDICATIONS  (PRN):  acetaminophen     Tablet .. 650 milliGRAM(s) Oral every 6 hours PRN Mild Pain (1 - 3)  loperamide 2 milliGRAM(s) Oral Once PRN Diarrhea  oxyCODONE    IR 10 milliGRAM(s) Oral every 4 hours PRN Severe Pain (7 - 10)  oxyCODONE    IR 5 milliGRAM(s) Oral every 4 hours PRN Moderate Pain (4 - 6)      Allergies    morphine (Unknown)  pain medicine, name unknown (Unknown)  penicillins (Unknown)    Intolerances        REVIEW OF SYSTEMS:  All other review of systems is negative unless indicated above    VITAL SIGNS:   Vital Signs Last 24 Hrs  T(C): 36.7 (31 Jan 2023 13:31), Max: 36.7 (31 Jan 2023 13:31)  T(F): 98 (31 Jan 2023 13:31), Max: 98 (31 Jan 2023 13:31)  HR: 84 (31 Jan 2023 13:31) (84 - 84)  BP: 136/78 (31 Jan 2023 13:31) (136/78 - 136/78)  BP(mean): --  RR: 18 (31 Jan 2023 13:31) (18 - 18)  SpO2: 98% (31 Jan 2023 13:31) (98% - 98%)    Parameters below as of 31 Jan 2023 13:31  Patient On (Oxygen Delivery Method): room air        I&O's Summary      On Exam:    Constitutional: NAD, alert and oriented x 3, obese  Lungs:  Non-labored, breath sounds are clear bilaterally, No wheezing, rales or rhonchi  Cardiovascular: RRR.  S1 and S2 positive.  No murmurs, rubs, gallops or clicks  Gastrointestinal: Bowel Sounds present, soft, nontender.   Lymph: No peripheral edema. No cervical lymphadenopathy.  Neurological: Alert, no focal deficits  Skin: No rashes or ulcers   Psych:  Mood & affect appropriate.    LABS: All Labs Reviewed:                        12.9   7.71  )-----------( 311      ( 31 Jan 2023 14:10 )             42.0     31 Jan 2023 14:10    137    |  103    |  20     ----------------------------<  282    4.6     |  27     |  1.50     Ca    9.7        31 Jan 2023 14:10      PT/INR - ( 31 Jan 2023 14:10 )   PT: 12.7 sec;   INR: 1.08 ratio         PTT - ( 31 Jan 2023 14:10 )  PTT:33.6 sec    < from: 12 Lead ECG (01.31.23 @ 14:21) >    Ventricular Rate 76 BPM    Atrial Rate 76 BPM    P-R Interval 164 ms    QRS Duration 100 ms    Q-T Interval 360 ms    QTC Calculation(Bazett) 405 ms    P Axis 38 degrees    R Axis -4 degrees    T Axis 68 degrees    Diagnosis Line Normal sinus rhythm  Minimal voltage criteria for LVH, may be normal variant ( Gautier product )  T wave abnormality, consider lateral ischemia  Abnormal ECG  No previous ECGs available    < end of copied text >         [facility] - Shruthi Cedillo, Jessica Huynh, Edwin Diamond Savella  Office Number: [phone]    Initial Consult Note    CHIEF COMPLAINT: Patient is a 52y old  Male who presents with a chief complaint of Lumbar Radiculopathy       HPI:  52 year old male with past medical history of CAP, obesity , chronic diastolic chf, htn presenting   for surgery scheduled for tomorrow.  Reports he was told by his surgeon to come to ED for surgery scheduled for 2/1. He does not know his cardiologist name ,follows with [facility] had scheduled appt for tomorrow.   Cardiology consulted for pre op evaluation,.  patient reports back pain now planned for L4-L5 posterior spinal decompression.   He is unclear on his history aside from htn. Denies chest pain dizziness palpitations dyspnea. No recent illness, n/v/d fever or chills.  Does report he had echo and stress test in past unsure where or what dates.     PAST MEDICAL & SURGICAL HISTORY:  Hypertension      CAP (community acquired pneumonia)      No significant past surgical history      Vital Signs Last 24 Hrs  T(C): 36.7 (31 Jan 2023 13:31), Max: 36.7 (31 Jan 2023 13:31)  T(F): 98 (31 Jan 2023 13:31), Max: 98 (31 Jan 2023 13:31)  HR: 84 (31 Jan 2023 13:31) (84 - 84)  BP: 136/78 (31 Jan 2023 13:31) (136/78 - 136/78)  BP(mean): --  RR: 18 (31 Jan 2023 13:31) (18 - 18)  SpO2: 98% (31 Jan 2023 13:31) (98% - 98%)    Parameters below as of 31 Jan 2023 13:31  Patient On (Oxygen Delivery Method): room air      I&O's Detail      LABS:                         PAST MEDICAL & SURGICAL HISTORY:  Hypertension      CAP (community acquired pneumonia)      No significant past surgical history          SOCIAL HISTORY:    denies   No tobacco, ethanol, or drug abuse.    FAMILY HISTORY:  FH: HTN (hypertension) (Father, Mother)      No family history of acute MI or sudden cardiac death.    MEDICATIONS  (STANDING):  atorvastatin 20 milliGRAM(s) Oral at bedtime  carvedilol 12.5 milliGRAM(s) Oral every 12 hours  lisinopril 20 milliGRAM(s) Oral daily  spironolactone 25 milliGRAM(s) Oral daily    MEDICATIONS  (PRN):  acetaminophen     Tablet .. 650 milliGRAM(s) Oral every 6 hours PRN Mild Pain (1 - 3)  loperamide 2 milliGRAM(s) Oral Once PRN Diarrhea  oxyCODONE    IR 10 milliGRAM(s) Oral every 4 hours PRN Severe Pain (7 - 10)  oxyCODONE    IR 5 milliGRAM(s) Oral every 4 hours PRN Moderate Pain (4 - 6)      Allergies    morphine (Unknown)  pain medicine, name unknown (Unknown)  penicillins (Unknown)    Intolerances        REVIEW OF SYSTEMS:  All other review of systems is negative unless indicated above    VITAL SIGNS:   Vital Signs Last 24 Hrs  T(C): 36.7 (31 Jan 2023 13:31), Max: 36.7 (31 Jan 2023 13:31)  T(F): 98 (31 Jan 2023 13:31), Max: 98 (31 Jan 2023 13:31)  HR: 84 (31 Jan 2023 13:31) (84 - 84)  BP: 136/78 (31 Jan 2023 13:31) (136/78 - 136/78)  BP(mean): --  RR: 18 (31 Jan 2023 13:31) (18 - 18)  SpO2: 98% (31 Jan 2023 13:31) (98% - 98%)    Parameters below as of 31 Jan 2023 13:31  Patient On (Oxygen Delivery Method): room air        I&O's Summary      On Exam:    Constitutional: NAD, alert and oriented x 3, obese  Lungs:  Non-labored, breath sounds are clear bilaterally, No wheezing, rales or rhonchi  Cardiovascular: RRR.  S1 and S2 positive.  No murmurs, rubs, gallops or clicks  Gastrointestinal: Bowel Sounds present, soft, nontender.   Lymph: No peripheral edema. No cervical lymphadenopathy.  Neurological: Alert, no focal deficits  Skin: No rashes or ulcers   Psych:  Mood & affect appropriate.    LABS: All Labs Reviewed:                        12.9   7.71  )-----------( 311      ( 31 Jan 2023 14:10 )             42.0     31 Jan 2023 14:10    137    |  103    |  20     ----------------------------<  282    4.6     |  27     |  1.50     Ca    9.7        31 Jan 2023 14:10      PT/INR - ( 31 Jan 2023 14:10 )   PT: 12.7 sec;   INR: 1.08 ratio         PTT - ( 31 Jan 2023 14:10 )  PTT:33.6 sec    < from: 12 Lead ECG (01.31.23 @ 14:21) >    Ventricular Rate 76 BPM    Atrial Rate 76 BPM    P-R Interval 164 ms    QRS Duration 100 ms    Q-T Interval 360 ms    QTC Calculation(Bazett) 405 ms    P Axis 38 degrees    R Axis -4 degrees    T Axis 68 degrees    Diagnosis Line Normal sinus rhythm  Minimal voltage criteria for LVH, may be normal variant ( Willy product )  T wave abnormality, consider lateral ischemia  Abnormal ECG  No previous ECGs available    < end of copied text >

## 2023-01-31 NOTE — H&P ADULT - ATTENDING COMMENTS
Above note reviewed. Agree with above.    The risks and benefits of operative versus nonoperative management were discussed with the patient in detail including but not limited to infection, bleeding, wound complication, persistent or worsening or new radicular symptoms, persistent or new neurologic deficit from neural injury including paralysis, persistent or new back pain, dural tear with cerebrospinal fluid leak, need for future surgery, adjacent segment disease, stroke, blindness, death and medical complications such as pneumonia or organ failure. Risks associated with anesthesia were discussed including but not limited to myocardial infarction, stroke and death. Risks and benefits of blood transfusion were discussed including but was not limited to blood reaction and infection. Questions were sought and answered satisfactorily. After understanding risks, benefits, and alternatives to surgical management, informed consent was obtained.      Kashif Capone DO  Orthopedic and Spine Surgeon  [facility]

## 2023-01-31 NOTE — H&P ADULT - HISTORY OF PRESENT ILLNESS
Patient is a 52yMale community ambulator without assistive devices who presents to [facility] ED w/ a c/o of right sided lumbar radicular pain/ tingling and weakness. Patient states he sustained a work related injury in october. Endorses radicular pain that radiates from lumbar spine to RLE, endorses intermitent numbness/tingling/paresthesias/weakness, denies incontinence of bowel/bladder, denies saddle anesthesia. Denies having any other pain elsewhere. No other orthopaedic concerns at this time.       PAST MEDICAL & SURGICAL HISTORY:  Hypertension      CAP (community acquired pneumonia)      No significant past surgical history      Vital Signs Last 24 Hrs  T(C): 36.7 (31 Jan 2023 13:31), Max: 36.7 (31 Jan 2023 13:31)  T(F): 98 (31 Jan 2023 13:31), Max: 98 (31 Jan 2023 13:31)  HR: 84 (31 Jan 2023 13:31) (84 - 84)  BP: 136/78 (31 Jan 2023 13:31) (136/78 - 136/78)  BP(mean): --  RR: 18 (31 Jan 2023 13:31) (18 - 18)  SpO2: 98% (31 Jan 2023 13:31) (98% - 98%)    Parameters below as of 31 Jan 2023 13:31  Patient On (Oxygen Delivery Method): room air      I&O's Detail      LABS:        PHYSICAL EXAM:  General; Awake and alert, Oriented x 3  Spine: No TTP over spinous processes or paraspinal muscles at C/T/L spine.   No palpable step off.   Able to actively SLR BL.   No pain to passive SLR   Ambulating w/o assistance             Motor exam:        C5       C6       C7       C8       T1   R  5/5      5/5     5/5     5/5      5/5  L   5/5      5/5     5/5     5/5      5/5         L2        L3       L4       L5      S1  R  5/5      5/5     5/5     5/5    4/5  L   5/5     5/5      5/5     5/5    5/5    Sensory:  (0=absent, 1=impaired, 2=normal, NT=not testable)      C5    C6   C7   C8   T1         R   2     2      2     2      2  L    2     2      2     2      2        L2    L3   L4   L5   S1   R   2     2     2     1     2  L    2     2     2     2     2    Right Upper extremity:   Negative Smith  +Rad Pulse  Compartments soft and compressible    Left Upper extremity:   Negative Smith  +Rad Pulse  Compartments soft and compressible    Right Lower Extremity:   SILT L2-S1  Negative Clonus  Negative Babinski  +DP  Compartments soft and compressible           Left Lower Extremity:  SILT L2-S1  Negative Clonus   Negative Babinski  +DP  Compartments soft and compressible    Secondary  Skin intact. No erythema/ecchymosis. No TTP over bony prominences, SILT, palpable pulses, full/painless A/PROM, compartments soft. No other injuries or complaints. Negative logroll/heelstrike BL.                                          A/P :   Patient is a 52yMale w/right sided lumbar radiculopathy     -Clinical presentation and physical exam are not consistent w/ acute cord compression/cauda equina. Does not demonstrate red flag symptoms such as bowel/bladder incontinence, saddle anesthesia, fevers/chills, or weight loss.****    -Plan for OR tomorrow with Dr. Capone for L4-5 Posterior spinal decompression  - NPO after midnight   - FU preop labs   - Hold chemical DVT ppx, SCDs okay   - Please document medical optimization/clearance for OR  -All patient's questions answered. Patient understands and agrees w/ above plan.  -Will discuss w/ attending and advise if plan changes.  -Clinical presentation discussed w/ Dr. Capone who is aware and agrees w/ above plan.   Patient is a 52yMale, who ambulates in the community with a cane, who presents to [facility] ED with progressive right lower extremity radicular pain, numbness, subjective weakness, and parethesias since a work-related accident in October 2022. He reports that the symptoms have become progressively severe despite conservative management and significantly interfere with his activities of daily living. He denies incontinence of bowel/bladder, denies saddle anesthesia, or any other complaints at this time.     PAST MEDICAL & SURGICAL HISTORY:  Hypertension  CAP (community acquired pneumonia)    No significant past surgical history    Vital Signs Last 24 Hrs  T(C): 36.7 (31 Jan 2023 13:31), Max: 36.7 (31 Jan 2023 13:31)  T(F): 98 (31 Jan 2023 13:31), Max: 98 (31 Jan 2023 13:31)  HR: 84 (31 Jan 2023 13:31) (84 - 84)  BP: 136/78 (31 Jan 2023 13:31) (136/78 - 136/78)  BP(mean): --  RR: 18 (31 Jan 2023 13:31) (18 - 18)  SpO2: 98% (31 Jan 2023 13:31) (98% - 98%)    Parameters below as of 31 Jan 2023 13:31  Patient On (Oxygen Delivery Method): room air      I&O's Detail      LABS:        PHYSICAL EXAM:  General; Awake and alert, Oriented x 3  Spine: No TTP over spinous processes or paraspinal muscles at C/T/L spine.   No palpable step off.   Able to actively SLR BL.   No pain to passive SLR   Ambulating w/o assistance             Motor exam:        C5       C6       C7       C8       T1   R  5/5      5/5     5/5     5/5      5/5  L   5/5      5/5     5/5     5/5      5/5         L2        L3       L4       L5      S1  R  5/5      5/5     5/5     5/5    4/5  L   5/5     5/5      5/5     5/5    5/5    Sensory:  (0=absent, 1=impaired, 2=normal, NT=not testable)      C5    C6   C7   C8   T1         R   2     2      2     2      2  L    2     2      2     2      2        L2    L3   L4   L5   S1   R   2     2     2     2     2  L    2     2     2     2     2    Right Upper extremity:   Negative Smith  +Rad Pulse  Compartments soft and compressible    Left Upper extremity:   Negative Smith  +Rad Pulse  Compartments soft and compressible    Right Lower Extremity:   Negative Clonus  Negative Babinski  +DP  Compartments soft and compressible           Left Lower Extremity:  Negative Clonus   Negative Babinski  +DP  Compartments soft and compressible                                          A/P :   Patient is a 52yMale with severe L4-L5 central canal stenosis    -Plan for OR tomorrow with Dr. Capone for L4-5 Posterior spinal decompression  - NPO after midnight   - FU preop labs   - Hold chemical DVT ppx, SCDs okay   - Please document medical optimization/clearance for OR  -All patient's questions answered. Patient understands and agrees w/ above plan.  -Will discuss w/ attending and advise if plan changes.  -Clinical presentation discussed w/ Dr. Capone who is aware and agrees w/ above plan.

## 2023-01-31 NOTE — CONSULT NOTE ADULT - ATTENDING COMMENTS
52 year old male with pmhx of htn, CAP, obesity, chronic diastolic CHF presents for surgery scheduled for tomorrow (2/1) with Dr. Capone for L4-5 Posterior spinal decompression. Medicine is being consulted for surgery clearance.     HPI as above.   Patient denies any past medical history of DM2/CHF/TIA/CVA/CAD/MI.     However as per cardiology note patient has hx of diastolic CHF    Patient can ambulate up two flights of stairs without chest pain/SOB.  Denies any family history of adverse reactions to anesthesia.     T(C): 36.7 (01-31-23 @ 13:31), Max: 36.7 (01-31-23 @ 13:31)  HR: 84 (01-31-23 @ 13:31) (84 - 84)  BP: 136/78 (01-31-23 @ 13:31) (136/78 - 136/78)  RR: 18 (01-31-23 @ 13:31) (18 - 18)  SpO2: 98% (01-31-23 @ 13:31) (98% - 98%)  Wt(kg): --    Physical Exam:   GENERAL: well-groomed, obese male, NAD  HEENT: head NC/AT;  conjunctiva & sclera clear; hearing grossly intact, moist mucous membranes  NECK: supple, no JVD  RESPIRATORY: CTA B/L, no wheezing, rales, rhonchi or rubs  CARDIOVASCULAR: S1&S2, RRR, no murmurs or gallops  ABDOMEN: soft, non-tender, non-distended, + Bowel sounds x4 quadrants, no guarding, rebound or rigidity  MUSCULOSKELETAL:  no clubbing, cyanosis or edema of all 4 extremities  LYMPH: no cervical lymphadenopathy  VASCULAR: Radial pulses 2+ bilaterally, no varicose veins   SKIN: warm and dry, color normal  NEUROLOGIC: AA&O X3, CN2-12 intact w/ no focal deficits, no sensory loss, motor Strength 5/5 in UE & LE B/L  Psych: Normal mood and affect, normal behavior    Plan:  Medical clearance pending results of TTE  pulmonary nodule: patient informed of findings of CXR and presence of a pulomary nodule  -patient instructed that he will need to schedule an outpatient CXR or CT Chest as an outpatient to ensure stability of the nodule. The nodule in the worst case scenario could be malignant. Patient undestands that he needs to f/u with his PCP within 1 months of hospital discharge to discuss scheduling repeat chest imaging.   -microcytic anemia: f/u iron studies. patient instructed to establish care with a GI doctor to schedule a screening colonoscopy as an outpatient. establish care with GI within 1 month of hospital discharge.     DVT ppx per orthopedic team 52 year old male with pmhx of htn, CAP, obesity, chronic diastolic CHF presents for surgery scheduled for tomorrow (2/1) with Dr. Capone for L4-5 Posterior spinal decompression. Medicine is being consulted for surgery clearance.     HPI as above.   Patient denies any past medical history of DM2/CHF/TIA/CVA/CAD/MI.     However as per cardiology note patient has hx of diastolic CHF    Patient can ambulate up two flights of stairs without chest pain/SOB.  Denies any family history of adverse reactions to anesthesia.     T(C): 36.7 (01-31-23 @ 13:31), Max: 36.7 (01-31-23 @ 13:31)  HR: 84 (01-31-23 @ 13:31) (84 - 84)  BP: 136/78 (01-31-23 @ 13:31) (136/78 - 136/78)  RR: 18 (01-31-23 @ 13:31) (18 - 18)  SpO2: 98% (01-31-23 @ 13:31) (98% - 98%)  Wt(kg): --    Physical Exam:   GENERAL: well-groomed, obese male, NAD  HEENT: head NC/AT;  conjunctiva & sclera clear; hearing grossly intact, moist mucous membranes  NECK: supple, no JVD  RESPIRATORY: CTA B/L, no wheezing, rales, rhonchi or rubs  CARDIOVASCULAR: S1&S2, RRR, no murmurs or gallops  ABDOMEN: soft, non-tender, non-distended, + Bowel sounds x4 quadrants, no guarding, rebound or rigidity  MUSCULOSKELETAL:  no clubbing, cyanosis or edema of all 4 extremities  LYMPH: no cervical lymphadenopathy  VASCULAR: Radial pulses 2+ bilaterally, no varicose veins   SKIN: warm and dry, color normal  NEUROLOGIC: AA&O X3, CN2-12 intact w/ no focal deficits, no sensory loss, motor Strength 5/5 in UE & LE B/L  Psych: Normal mood and affect, normal behavior    Plan:  Medical clearance pending results of TTE  pulmonary nodule: patient informed of findings of CXR and presence of a pulomary nodule  -patient instructed that he will need to schedule an CT Chest as an outpatient to ensure stability of the nodule. The nodule in the worst case scenario could be malignant. Patient undestands that he needs to f/u with his PCP within 1 months of hospital discharge to discuss scheduling repeat chest imaging.   -microcytic anemia: f/u iron studies. patient instructed to establish care with a GI doctor to schedule a screening colonoscopy as an outpatient. establish care with GI within 1 month of hospital discharge.     DVT ppx per orthopedic team

## 2023-01-31 NOTE — ED PROVIDER NOTE - CLINICAL SUMMARY MEDICAL DECISION MAKING FREE TEXT BOX
Flowers: 53 yo M PMHx HTN, right sided lumbar radiculopathy presents to ED for surgery with Dr. Capone tomorrow- L4-5 Posterior spinal decompression. Pt reports chronic low back pain x nearly 20 yrs after a work injury. however states pain has been getting worse over the last several months. Pt reports pain right lumbar spine radiating into his RLE with occasional tingling. Pt denies abd pain, bladder/bowel incontinence, saddle anesthesia, chest pain, fever/chills.

## 2023-01-31 NOTE — CONSULT NOTE ADULT - PROBLEM SELECTOR RECOMMENDATION 4
Hold chemical prophylaxis prior to surgery, SCD's okay Cr 1.5  - avoid nephrotoxic meds Cr 1.5  - avoid nephrotoxic meds  -monitor Cr

## 2023-01-31 NOTE — CONSULT NOTE ADULT - SUBJECTIVE AND OBJECTIVE BOX
Patient is a 52yMale community ambulator without assistive devices who presents [facility] w/ a c/o of right sided lumbar radicular pain/ tingling and weakness. Patient states he sustained a work related injury in october. Endorses radicular pain that radiates from lumbar spine to RLE, endorses intermitent numbness/tingling/paresthesias/weakness, denies incontinence of bowel/bladder, denies saddle anesthesia. Denies having any other pain elsewhere. No other orthopaedic concerns at this time.       PAST MEDICAL & SURGICAL HISTORY:  Hypertension      CAP (community acquired pneumonia)      No significant past surgical history      Vital Signs Last 24 Hrs  T(C): 36.7 (31 Jan 2023 13:31), Max: 36.7 (31 Jan 2023 13:31)  T(F): 98 (31 Jan 2023 13:31), Max: 98 (31 Jan 2023 13:31)  HR: 84 (31 Jan 2023 13:31) (84 - 84)  BP: 136/78 (31 Jan 2023 13:31) (136/78 - 136/78)  BP(mean): --  RR: 18 (31 Jan 2023 13:31) (18 - 18)  SpO2: 98% (31 Jan 2023 13:31) (98% - 98%)    Parameters below as of 31 Jan 2023 13:31  Patient On (Oxygen Delivery Method): room air      I&O's Detail      LABS:        PHYSICAL EXAM:  General; Awake and alert, Oriented x 3  Spine: No TTP over spinous processes or paraspinal muscles at C/T/L spine.   No palpable step off.   Able to actively SLR BL.   No pain to passive SLR   Ambulating w/o assistance             Motor exam:        C5       C6       C7       C8       T1   R  5/5      5/5     5/5     5/5      5/5  L   5/5      5/5     5/5     5/5      5/5         L2        L3       L4       L5      S1  R  5/5      5/5     5/5     5/5    4/5  L   5/5     5/5      5/5     5/5    5/5    Sensory:  (0=absent, 1=impaired, 2=normal, NT=not testable)      C5    C6   C7   C8   T1         R   2     2      2     2      2  L    2     2      2     2      2        L2    L3   L4   L5   S1   R   2     2     2     1     2  L    2     2     2     2     2    Right Upper extremity:   Negative Smith  +Rad Pulse  Compartments soft and compressible    Left Upper extremity:   Negative Smith  +Rad Pulse  Compartments soft and compressible    Right Lower Extremity:   SILT L2-S1  Negative Clonus  Negative Babinski  +DP  Compartments soft and compressible           Left Lower Extremity:  SILT L2-S1  Negative Clonus   Negative Babinski  +DP  Compartments soft and compressible    Secondary  Skin intact. No erythema/ecchymosis. No TTP over bony prominences, SILT, palpable pulses, full/painless A/PROM, compartments soft. No other injuries or complaints. Negative logroll/heelstrike BL.                                          A/P :   Patient is a 52yMale w/right sided lumbar radiculopathy     -Clinical presentation and physical exam are not consistent w/ acute cord compression/cauda equina. Does not demonstrate red flag symptoms such as bowel/bladder incontinence, saddle anesthesia, fevers/chills, or weight loss.****    -Plan for OR tomorrow with Dr. Capone for L4-5 Posterior spinal decompression  - NPO after midnight   - FU preop labs   - Hold chemical DVT ppx, SCDs okay   - Please document medical optimization/clearance for OR  -All patient's questions answered. Patient understands and agrees w/ above plan.  -Will discuss w/ attending and advise if plan changes.  -Clinical presentation discussed w/ Dr. Capone who is aware and agrees w/ above plan.

## 2023-01-31 NOTE — CONSULT NOTE ADULT - SUBJECTIVE AND OBJECTIVE BOX
Patient is a 52y old  Male who presents with a chief complaint of Lumbar Radiculopathy (31 Jan 2023 15:46)      FROM ADMISSION H+P:   HPI:  Patient is a 52yMale community ambulator without assistive devices who presents to [facility] ED w/ a c/o of right sided lumbar radicular pain/ tingling and weakness. Patient states he sustained a work related injury in october. Endorses radicular pain that radiates from lumbar spine to RLE, endorses intermitent numbness/tingling/paresthesias/weakness, denies incontinence of bowel/bladder, denies saddle anesthesia. Denies having any other pain elsewhere. No other orthopaedic concerns at this time.       PAST MEDICAL & SURGICAL HISTORY:  Hypertension      CAP (community acquired pneumonia)      No significant past surgical history      Vital Signs Last 24 Hrs  T(C): 36.7 (31 Jan 2023 13:31), Max: 36.7 (31 Jan 2023 13:31)  T(F): 98 (31 Jan 2023 13:31), Max: 98 (31 Jan 2023 13:31)  HR: 84 (31 Jan 2023 13:31) (84 - 84)  BP: 136/78 (31 Jan 2023 13:31) (136/78 - 136/78)  BP(mean): --  RR: 18 (31 Jan 2023 13:31) (18 - 18)  SpO2: 98% (31 Jan 2023 13:31) (98% - 98%)    Parameters below as of 31 Jan 2023 13:31  Patient On (Oxygen Delivery Method): room air      I&O's Detail      LABS:        PHYSICAL EXAM:  General; Awake and alert, Oriented x 3  Spine: No TTP over spinous processes or paraspinal muscles at C/T/L spine.   No palpable step off.   Able to actively SLR BL.   No pain to passive SLR   Ambulating w/o assistance             Motor exam:        C5       C6       C7       C8       T1   R  5/5      5/5     5/5     5/5      5/5  L   5/5      5/5     5/5     5/5      5/5         L2        L3       L4       L5      S1  R  5/5      5/5     5/5     5/5    4/5  L   5/5     5/5      5/5     5/5    5/5    Sensory:  (0=absent, 1=impaired, 2=normal, NT=not testable)      C5    C6   C7   C8   T1         R   2     2      2     2      2  L    2     2      2     2      2        L2    L3   L4   L5   S1   R   2     2     2     1     2  L    2     2     2     2     2    Right Upper extremity:   Negative Smith  +Rad Pulse  Compartments soft and compressible    Left Upper extremity:   Negative Smith  +Rad Pulse  Compartments soft and compressible    Right Lower Extremity:   SILT L2-S1  Negative Clonus  Negative Babinski  +DP  Compartments soft and compressible           Left Lower Extremity:  SILT L2-S1  Negative Clonus   Negative Babinski  +DP  Compartments soft and compressible    Secondary  Skin intact. No erythema/ecchymosis. No TTP over bony prominences, SILT, palpable pulses, full/painless A/PROM, compartments soft. No other injuries or complaints. Negative logroll/heelstrike BL.                                          A/P :   Patient is a 52yMale w/right sided lumbar radiculopathy     -Clinical presentation and physical exam are not consistent w/ acute cord compression/cauda equina. Does not demonstrate red flag symptoms such as bowel/bladder incontinence, saddle anesthesia, fevers/chills, or weight loss.****    -Plan for OR tomorrow with Dr. Capone for L4-5 Posterior spinal decompression  - NPO after midnight   - FU preop labs   - Hold chemical DVT ppx, SCDs okay   - Please document medical optimization/clearance for OR  -All patient's questions answered. Patient understands and agrees w/ above plan.  -Will discuss w/ attending and advise if plan changes.  -Clinical presentation discussed w/ Dr. Capone who is aware and agrees w/ above plan.   (31 Jan 2023 14:00)      ----  INTERVAL HPI/OVERNIGHT EVENTS: Pt seen and evaluated at the bedside. 52 year old male with pmhx of htn, CAP, obesity, chronic diastolic CHF presents for surgery scheduled for tomorrow (2/1) with Dr. Capone. He states that he sustained a work related injury in october at his job as a . He endorses radicular pain that radiates from the lumbar spine to the RLE. He gets periodic numbness and tingling in that extremity regardless of what he is doing. He is a poor historian but reports that he had pneumonia around 2018 and that triggered a bunch of tests and procedures including stress tests and echo's. He does not recall the results of them and does not recall the name of the cardiologist. He follows with [facility] and had an appointment scheduled for tomorrow. The purpose of the appointment is unclear at this time. Denies incontinence of bowel/bladder, denies saddle anesthesia. Denies having any other pain elsewhere. No other orthopaedic concerns at this time. Pt uses a cane to ambulate since his injury.       ----  PAST MEDICAL & SURGICAL HISTORY:  Hypertension      CAP (community acquired pneumonia)      No significant past surgical history          ----  Home medications:    Lisinopril 40qd  carvedilol 25 BID  Spironolactone 25 qd      ----  FAMILY HISTORY:  FH: HTN (hypertension) (Father, Mother)  - he is unclear about his father's medical history as he states he doesn't know him        ----  Allergies    morphine (Unknown)  pain medicine, name unknown (Unknown)  penicillins (Unknown)    Intolerances    lactose intolerant      ----  Social History:  - etoh: denies  - tobacco: denies  - occupation: [employer]  - living circumstances: lives with mother of his child   - ambulation status: with cane since his injury    ----  REVIEW OF SYSTEMS:  CONSTITUTIONAL: denies fever, chills, fatigue, weakness  HEENT: denies blurred vision, sore throat  SKIN: denies new lesions, rash  CARDIOVASCULAR: denies chest pain, chest pressure, palpitations  RESPIRATORY: denies shortness of breath, sputum production  GASTROINTESTINAL: denies nausea, vomiting, diarrhea, abdominal pain  GENITOURINARY: denies dysuria, discharge  NEUROLOGICAL: RLE numbness and tingling intermittently   MUSCULOSKELETAL: back pain  HEMATOLOGIC: denies gross bleeding, bruising  PSYCHIATRIC: denies recent changes in anxiety, depression  ENDOCRINOLOGIC: denies sweating, cold or heat intolerance    ----  Constitutional: NAD, alert and oriented x 3, obese  Lungs:  Non-labored, breath sounds are clear bilaterally, No wheezing, rales or rhonchi  Cardiovascular: RRR.  S1 and S2 positive.  No murmurs, rubs, gallops or clicks  Gastrointestinal: Bowel Sounds present, soft, nontender.   Lymph: No peripheral edema. No cervical lymphadenopathy.  MSK: LLE 5/5, RLE 4/5  Neurological: Alert, no focal deficits  Skin: No rashes or ulcers   Psych:  Mood & affect appropriate.    T(C): 36.7 (01-31-23 @ 13:31), Max: 36.7 (01-31-23 @ 13:31)  HR: 84 (01-31-23 @ 13:31) (84 - 84)  BP: 136/78 (01-31-23 @ 13:31) (136/78 - 136/78)  RR: 18 (01-31-23 @ 13:31) (18 - 18)  SpO2: 98% (01-31-23 @ 13:31) (98% - 98%)  Wt(kg): --    ----  I&O's Summary      LABS:                        12.9   7.71  )-----------( 311      ( 31 Jan 2023 14:10 )             42.0     01-31    137  |  103  |  20  ----------------------------<  282<H>  4.6   |  27  |  1.50<H>    Ca    9.7      31 Jan 2023 14:10      PT/INR - ( 31 Jan 2023 14:10 )   PT: 12.7 sec;   INR: 1.08 ratio         PTT - ( 31 Jan 2023 14:10 )  PTT:33.6 sec    CAPILLARY BLOOD GLUCOSE                    ----  Personally reviewed:  Vital sign trends: [x  ] yes    [  ] no     [  ] n/a  Laboratory results: [ x ] yes    [  ] no     [  ] n/a  Radiology results: [ x ] yes    [  ] no     [  ] n/a  Culture results: [  ] yes    [  ] no     [ x ] n/a  Consultant recommendations: [ x ] yes    [  ] no     [  ] n/a         Patient is a 52y old  Male who presents with a chief complaint of Lumbar Radiculopathy (31 Jan 2023 15:46)      FROM ADMISSION H+P:   HPI:  Patient is a 52yMale community ambulator without assistive devices who presents to [facility] ED w/ a c/o of right sided lumbar radicular pain/ tingling and weakness. Patient states he sustained a work related injury in october. Endorses radicular pain that radiates from lumbar spine to RLE, endorses intermitent numbness/tingling/paresthesias/weakness, denies incontinence of bowel/bladder, denies saddle anesthesia. Denies having any other pain elsewhere. No other orthopaedic concerns at this time.       PAST MEDICAL & SURGICAL HISTORY:  Hypertension      CAP (community acquired pneumonia)      No significant past surgical history      Vital Signs Last 24 Hrs  T(C): 36.7 (31 Jan 2023 13:31), Max: 36.7 (31 Jan 2023 13:31)  T(F): 98 (31 Jan 2023 13:31), Max: 98 (31 Jan 2023 13:31)  HR: 84 (31 Jan 2023 13:31) (84 - 84)  BP: 136/78 (31 Jan 2023 13:31) (136/78 - 136/78)  BP(mean): --  RR: 18 (31 Jan 2023 13:31) (18 - 18)  SpO2: 98% (31 Jan 2023 13:31) (98% - 98%)    Parameters below as of 31 Jan 2023 13:31  Patient On (Oxygen Delivery Method): room air      I&O's Detail      LABS:        PHYSICAL EXAM:  General; Awake and alert, Oriented x 3  Spine: No TTP over spinous processes or paraspinal muscles at C/T/L spine.   No palpable step off.   Able to actively SLR BL.   No pain to passive SLR   Ambulating w/o assistance             Motor exam:        C5       C6       C7       C8       T1   R  5/5      5/5     5/5     5/5      5/5  L   5/5      5/5     5/5     5/5      5/5         L2        L3       L4       L5      S1  R  5/5      5/5     5/5     5/5    4/5  L   5/5     5/5      5/5     5/5    5/5    Sensory:  (0=absent, 1=impaired, 2=normal, NT=not testable)      C5    C6   C7   C8   T1         R   2     2      2     2      2  L    2     2      2     2      2        L2    L3   L4   L5   S1   R   2     2     2     1     2  L    2     2     2     2     2    Right Upper extremity:   Negative Smith  +Rad Pulse  Compartments soft and compressible    Left Upper extremity:   Negative Smith  +Rad Pulse  Compartments soft and compressible    Right Lower Extremity:   SILT L2-S1  Negative Clonus  Negative Babinski  +DP  Compartments soft and compressible           Left Lower Extremity:  SILT L2-S1  Negative Clonus   Negative Babinski  +DP  Compartments soft and compressible    Secondary  Skin intact. No erythema/ecchymosis. No TTP over bony prominences, SILT, palpable pulses, full/painless A/PROM, compartments soft. No other injuries or complaints. Negative logroll/heelstrike BL.                                          A/P :   Patient is a 52yMale w/right sided lumbar radiculopathy     -Clinical presentation and physical exam are not consistent w/ acute cord compression/cauda equina. Does not demonstrate red flag symptoms such as bowel/bladder incontinence, saddle anesthesia, fevers/chills, or weight loss.****    -Plan for OR tomorrow with Dr. Capone for L4-5 Posterior spinal decompression  - NPO after midnight   - FU preop labs   - Hold chemical DVT ppx, SCDs okay   - Please document medical optimization/clearance for OR  -All patient's questions answered. Patient understands and agrees w/ above plan.  -Will discuss w/ attending and advise if plan changes.  -Clinical presentation discussed w/ Dr. Capone who is aware and agrees w/ above plan.   (31 Jan 2023 14:00)      ----  INTERVAL HPI/OVERNIGHT EVENTS: Pt seen and evaluated at the bedside. 52 year old male with pmhx of htn, CAP, obesity, chronic diastolic CHF presents for surgery scheduled for tomorrow (2/1) with Dr. Capone. He states that he sustained a work related injury in october at his job as a . He endorses radicular pain that radiates from the lumbar spine to the RLE. He gets periodic numbness and tingling in that extremity regardless of what he is doing. He is a poor historian but reports that he had pneumonia around 2018 and that triggered a bunch of tests and procedures including stress tests and echo's. He does not recall the results of them and does not recall the name of the cardiologist. He follows with [facility] and had an appointment scheduled for tomorrow. The purpose of the appointment is unclear at this time. Denies incontinence of bowel/bladder, denies saddle anesthesia. Denies having any other pain elsewhere. No other orthopaedic concerns at this time. Pt uses a cane to ambulate since his injury.     EKG on admission: EKG SR 76bpm with TWI lateral.      ----  PAST MEDICAL & SURGICAL HISTORY:  Hypertension      CAP (community acquired pneumonia)      No significant past surgical history          ----  Home medications:    Lisinopril 40qd  carvedilol 25 BID  Spironolactone 25 qd      ----  FAMILY HISTORY:  FH: HTN (hypertension) (Father, Mother)  - he is unclear about his father's medical history as he states he doesn't know him        ----  Allergies    morphine (Unknown)  pain medicine, name unknown (Unknown)  penicillins (Unknown)    Intolerances    lactose intolerant      ----  Social History:  - etoh: denies  - tobacco: denies  - occupation: [employer]  - living circumstances: lives with mother of his child   - ambulation status: with cane since his injury    ----  REVIEW OF SYSTEMS:  CONSTITUTIONAL: denies fever, chills, fatigue, weakness  HEENT: denies blurred vision, sore throat  SKIN: denies new lesions, rash  CARDIOVASCULAR: denies chest pain, chest pressure, palpitations  RESPIRATORY: denies shortness of breath, sputum production  GASTROINTESTINAL: denies nausea, vomiting, diarrhea, abdominal pain  GENITOURINARY: denies dysuria, discharge  NEUROLOGICAL: RLE numbness and tingling intermittently   MUSCULOSKELETAL: back pain  HEMATOLOGIC: denies gross bleeding, bruising  PSYCHIATRIC: denies recent changes in anxiety, depression  ENDOCRINOLOGIC: denies sweating, cold or heat intolerance    ----  Constitutional: NAD, alert and oriented x 3, obese  Lungs:  Non-labored, breath sounds are clear bilaterally, No wheezing, rales or rhonchi  Cardiovascular: RRR.  S1 and S2 positive.  No murmurs, rubs, gallops or clicks  Gastrointestinal: Bowel Sounds present, soft, nontender.   Lymph: No peripheral edema. No cervical lymphadenopathy.  MSK: LLE 5/5, RLE 4/5  Neurological: Alert, no focal deficits  Skin: No rashes or ulcers   Psych:  Mood & affect appropriate.    T(C): 36.7 (01-31-23 @ 13:31), Max: 36.7 (01-31-23 @ 13:31)  HR: 84 (01-31-23 @ 13:31) (84 - 84)  BP: 136/78 (01-31-23 @ 13:31) (136/78 - 136/78)  RR: 18 (01-31-23 @ 13:31) (18 - 18)  SpO2: 98% (01-31-23 @ 13:31) (98% - 98%)  Wt(kg): --    ----  I&O's Summary      LABS:                        12.9   7.71  )-----------( 311      ( 31 Jan 2023 14:10 )             42.0     01-31    137  |  103  |  20  ----------------------------<  282<H>  4.6   |  27  |  1.50<H>    Ca    9.7      31 Jan 2023 14:10      PT/INR - ( 31 Jan 2023 14:10 )   PT: 12.7 sec;   INR: 1.08 ratio         PTT - ( 31 Jan 2023 14:10 )  PTT:33.6 sec    CAPILLARY BLOOD GLUCOSE                    ----  Personally reviewed:  Vital sign trends: [x  ] yes    [  ] no     [  ] n/a  Laboratory results: [ x ] yes    [  ] no     [  ] n/a  Radiology results: [ x ] yes    [  ] no     [  ] n/a  Culture results: [  ] yes    [  ] no     [ x ] n/a  Consultant recommendations: [ x ] yes    [  ] no     [  ] n/a         Patient is a 52y old  Male who presents with a chief complaint of Lumbar Radiculopathy (31 Jan 2023 15:46)      FROM ADMISSION H+P:   HPI:  Patient is a 52yMale community ambulator without assistive devices who presents to [facility] ED w/ a c/o of right sided lumbar radicular pain/ tingling and weakness. Patient states he sustained a work related injury in october. Endorses radicular pain that radiates from lumbar spine to RLE, endorses intermitent numbness/tingling/paresthesias/weakness, denies incontinence of bowel/bladder, denies saddle anesthesia. Denies having any other pain elsewhere. No other orthopaedic concerns at this time.       PAST MEDICAL & SURGICAL HISTORY:  Hypertension      CAP (community acquired pneumonia)      No significant past surgical history      Vital Signs Last 24 Hrs  T(C): 36.7 (31 Jan 2023 13:31), Max: 36.7 (31 Jan 2023 13:31)  T(F): 98 (31 Jan 2023 13:31), Max: 98 (31 Jan 2023 13:31)  HR: 84 (31 Jan 2023 13:31) (84 - 84)  BP: 136/78 (31 Jan 2023 13:31) (136/78 - 136/78)  BP(mean): --  RR: 18 (31 Jan 2023 13:31) (18 - 18)  SpO2: 98% (31 Jan 2023 13:31) (98% - 98%)    Parameters below as of 31 Jan 2023 13:31  Patient On (Oxygen Delivery Method): room air      I&O's Detail      LABS:        PHYSICAL EXAM:  General; Awake and alert, Oriented x 3  Spine: No TTP over spinous processes or paraspinal muscles at C/T/L spine.   No palpable step off.   Able to actively SLR BL.   No pain to passive SLR   Ambulating w/o assistance             Motor exam:        C5       C6       C7       C8       T1   R  5/5      5/5     5/5     5/5      5/5  L   5/5      5/5     5/5     5/5      5/5         L2        L3       L4       L5      S1  R  5/5      5/5     5/5     5/5    4/5  L   5/5     5/5      5/5     5/5    5/5    Sensory:  (0=absent, 1=impaired, 2=normal, NT=not testable)      C5    C6   C7   C8   T1         R   2     2      2     2      2  L    2     2      2     2      2        L2    L3   L4   L5   S1   R   2     2     2     1     2  L    2     2     2     2     2    Right Upper extremity:   Negative Smith  +Rad Pulse  Compartments soft and compressible    Left Upper extremity:   Negative Smith  +Rad Pulse  Compartments soft and compressible    Right Lower Extremity:   SILT L2-S1  Negative Clonus  Negative Babinski  +DP  Compartments soft and compressible           Left Lower Extremity:  SILT L2-S1  Negative Clonus   Negative Babinski  +DP  Compartments soft and compressible    Secondary  Skin intact. No erythema/ecchymosis. No TTP over bony prominences, SILT, palpable pulses, full/painless A/PROM, compartments soft. No other injuries or complaints. Negative logroll/heelstrike BL.                                          A/P :   Patient is a 52yMale w/right sided lumbar radiculopathy     -Clinical presentation and physical exam are not consistent w/ acute cord compression/cauda equina. Does not demonstrate red flag symptoms such as bowel/bladder incontinence, saddle anesthesia, fevers/chills, or weight loss.****    -Plan for OR tomorrow with Dr. Capone for L4-5 Posterior spinal decompression  - NPO after midnight   - FU preop labs   - Hold chemical DVT ppx, SCDs okay   - Please document medical optimization/clearance for OR  -All patient's questions answered. Patient understands and agrees w/ above plan.  -Will discuss w/ attending and advise if plan changes.  -Clinical presentation discussed w/ Dr. Capone who is aware and agrees w/ above plan.   (31 Jan 2023 14:00)      ----  INTERVAL HPI/OVERNIGHT EVENTS: Pt seen and evaluated at the bedside. 52 year old male with pmhx of htn, CAP, obesity, chronic diastolic CHF presents for surgery scheduled for tomorrow (2/1) with Dr. Capone. He states that he sustained a work related injury in october at his job as a . He endorses radicular pain that radiates from the lumbar spine to the RLE. He gets periodic numbness and tingling in that extremity regardless of what he is doing. He is a poor historian but reports that he had pneumonia around 2018 and that triggered a bunch of tests and procedures including stress tests and echo's. He does not recall the results of them and does not recall the name of the cardiologist. He follows with [facility] and had an appointment scheduled for tomorrow. The purpose of the appointment is unclear at this time. Denies incontinence of bowel/bladder, denies saddle anesthesia. Denies having any other pain elsewhere. No other orthopaedic concerns at this time. Pt uses a cane to ambulate since his injury.     EKG on admission: EKG SR 76bpm with TWI lateral.      ----  PAST MEDICAL & SURGICAL HISTORY:  Hypertension      CAP (community acquired pneumonia)      No significant past surgical history          ----  Home medications:    Lisinopril 40qd  carvedilol 25 BID  Spironolactone 25 qd  Gabapentin 600 qd prn  Methocarbamol 750 TID prn  Meloxicam 15mg qd PRN  ----  FAMILY HISTORY:  FH: HTN (hypertension) (Father, Mother)  - he is unclear about his father's medical history as he states he doesn't know him        ----  Allergies    morphine (Unknown)  pain medicine, name unknown (Unknown)  penicillins (Unknown)    Intolerances    lactose intolerant      ----  Social History:  - etoh: denies  - tobacco: denies  - occupation: [employer]  - living circumstances: lives with mother of his child   - ambulation status: with cane since his injury    ----  REVIEW OF SYSTEMS:  CONSTITUTIONAL: denies fever, chills, fatigue, weakness  HEENT: denies blurred vision, sore throat  SKIN: denies new lesions, rash  CARDIOVASCULAR: denies chest pain, chest pressure, palpitations  RESPIRATORY: denies shortness of breath, sputum production  GASTROINTESTINAL: denies nausea, vomiting, diarrhea, abdominal pain  GENITOURINARY: denies dysuria, discharge  NEUROLOGICAL: RLE numbness and tingling intermittently   MUSCULOSKELETAL: back pain  HEMATOLOGIC: denies gross bleeding, bruising  PSYCHIATRIC: denies recent changes in anxiety, depression  ENDOCRINOLOGIC: denies sweating, cold or heat intolerance    ----  Constitutional: NAD, alert and oriented x 3, obese  Lungs:  Non-labored, breath sounds are clear bilaterally, No wheezing, rales or rhonchi  Cardiovascular: RRR.  S1 and S2 positive.  No murmurs, rubs, gallops or clicks  Gastrointestinal: Bowel Sounds present, soft, nontender.   Lymph: No peripheral edema. No cervical lymphadenopathy.  MSK: LLE 5/5, RLE 4/5  Neurological: Alert, no focal deficits  Skin: No rashes or ulcers   Psych:  Mood & affect appropriate.    T(C): 36.7 (01-31-23 @ 13:31), Max: 36.7 (01-31-23 @ 13:31)  HR: 84 (01-31-23 @ 13:31) (84 - 84)  BP: 136/78 (01-31-23 @ 13:31) (136/78 - 136/78)  RR: 18 (01-31-23 @ 13:31) (18 - 18)  SpO2: 98% (01-31-23 @ 13:31) (98% - 98%)  Wt(kg): --    ----  I&O's Summary      LABS:                        12.9   7.71  )-----------( 311      ( 31 Jan 2023 14:10 )             42.0     01-31    137  |  103  |  20  ----------------------------<  282<H>  4.6   |  27  |  1.50<H>    Ca    9.7      31 Jan 2023 14:10      PT/INR - ( 31 Jan 2023 14:10 )   PT: 12.7 sec;   INR: 1.08 ratio         PTT - ( 31 Jan 2023 14:10 )  PTT:33.6 sec    CAPILLARY BLOOD GLUCOSE                    ----  Personally reviewed:  Vital sign trends: [x  ] yes    [  ] no     [  ] n/a  Laboratory results: [ x ] yes    [  ] no     [  ] n/a  Radiology results: [ x ] yes    [  ] no     [  ] n/a  Culture results: [  ] yes    [  ] no     [ x ] n/a  Consultant recommendations: [ x ] yes    [  ] no     [  ] n/a         Patient is a 52y old  Male who presents with a chief complaint of Lumbar Radiculopathy (31 Jan 2023 15:46)      FROM ADMISSION H+P:   HPI:  Patient is a 52yMale community ambulator without assistive devices who presents to [facility] ED w/ a c/o of right sided lumbar radicular pain/ tingling and weakness. Patient states he sustained a work related injury in october. Endorses radicular pain that radiates from lumbar spine to RLE, endorses intermitent numbness/tingling/paresthesias/weakness, denies incontinence of bowel/bladder, denies saddle anesthesia. Denies having any other pain elsewhere. No other orthopaedic concerns at this time.           ----  INTERVAL HPI/OVERNIGHT EVENTS: Pt seen and evaluated at the bedside. 52 year old male with pmhx of htn, CAP, obesity, chronic diastolic CHF presents for surgery scheduled for tomorrow (2/1) with Dr. Capone. He states that he sustained a work related injury in october at his job as a . He endorses radicular pain that radiates from the lumbar spine to the RLE. He gets periodic numbness and tingling in that extremity regardless of what he is doing. He is a poor historian but reports that he had pneumonia around 2018 and that triggered a bunch of tests and procedures including stress tests and echo's. He does not recall the results of them and does not recall the name of the cardiologist. He follows with [facility] and had an appointment scheduled for tomorrow. The purpose of the appointment is unclear at this time. Denies incontinence of bowel/bladder, denies saddle anesthesia. Denies having any other pain elsewhere. No other orthopaedic concerns at this time. Pt uses a cane to ambulate since his injury.     EKG on admission: EKG SR 76bpm with TWI lateral.      ----  PAST MEDICAL & SURGICAL HISTORY:  Hypertension      CAP (community acquired pneumonia)      No significant past surgical history          ----  Home medications:    Lisinopril 40qd  carvedilol 25 BID  Spironolactone 25 qd  Gabapentin 600 qd prn  Methocarbamol 750 TID prn  Meloxicam 15mg qd PRN  ----  FAMILY HISTORY:  FH: HTN (hypertension) (Father, Mother)  - he is unclear about his father's medical history as he states he doesn't know him        ----  Allergies    morphine (Unknown)  pain medicine, name unknown (Unknown)  penicillins (Unknown)    Intolerances    lactose intolerant      ----  Social History:  - etoh: denies  - tobacco: denies  - occupation: [employer]  - living circumstances: lives with mother of his child   - ambulation status: with cane since his injury    ----  REVIEW OF SYSTEMS:  CONSTITUTIONAL: denies fever, chills, fatigue, weakness  HEENT: denies blurred vision, sore throat  SKIN: denies new lesions, rash  CARDIOVASCULAR: denies chest pain, chest pressure, palpitations  RESPIRATORY: denies shortness of breath, sputum production  GASTROINTESTINAL: denies nausea, vomiting, diarrhea, abdominal pain, melena, hematochezia  GENITOURINARY: denies dysuria, discharge  NEUROLOGICAL: RLE numbness and tingling intermittently   MUSCULOSKELETAL: back pain  HEMATOLOGIC: denies gross bleeding, bruising  PSYCHIATRIC: denies recent changes in anxiety, depression  ENDOCRINOLOGIC: denies sweating, cold or heat intolerance    ----  Constitutional: NAD, alert and oriented x 3, obese  Lungs:  Non-labored, breath sounds are clear bilaterally, No wheezing, rales or rhonchi  Cardiovascular: RRR.  S1 and S2 positive.  No murmurs, rubs, gallops or clicks  Gastrointestinal: Bowel Sounds present, soft, nontender.   Lymph: No peripheral edema. No cervical lymphadenopathy.  MSK: LLE 5/5, RLE 4/5  Neurological: Alert, no focal deficits  Skin: No rashes or ulcers   Psych:  Mood & affect appropriate.    T(C): 36.7 (01-31-23 @ 13:31), Max: 36.7 (01-31-23 @ 13:31)  HR: 84 (01-31-23 @ 13:31) (84 - 84)  BP: 136/78 (01-31-23 @ 13:31) (136/78 - 136/78)  RR: 18 (01-31-23 @ 13:31) (18 - 18)  SpO2: 98% (01-31-23 @ 13:31) (98% - 98%)  Wt(kg): --    ----  I&O's Summary      LABS:                        12.9   7.71  )-----------( 311      ( 31 Jan 2023 14:10 )             42.0     01-31    137  |  103  |  20  ----------------------------<  282<H>  4.6   |  27  |  1.50<H>    Ca    9.7      31 Jan 2023 14:10      PT/INR - ( 31 Jan 2023 14:10 )   PT: 12.7 sec;   INR: 1.08 ratio         PTT - ( 31 Jan 2023 14:10 )  PTT:33.6 sec    CAPILLARY BLOOD GLUCOSE

## 2023-01-31 NOTE — CONSULT NOTE ADULT - NS ATTEND AMEND GEN_ALL_CORE FT
remote back injury with progressive neurologic symptoms  abnormal ekg indeterminate age  seems like he has had a workup in the past with some cv testing but no results available despite extensive time spent trying to get records  may be lvh related noting hx of htn  had been active until a few months ago and was able to climb stairs  am not comfortable suggesting that surgery can proceed without at least an echo to assess wall thickness and ef as means to eval ekg  have informed ortho that he cannot be first or second case and that echo must be done before surgery

## 2023-01-31 NOTE — CONSULT NOTE ADULT - PROBLEM SELECTOR RECOMMENDATION 6
- Chest X ray- 6 mm nodule of indeterminate etiology RIGHT lower zone periphery.  - No airspace consolidation.  - As no prior radiographs available for comparison follow-up chest radiographs in 6 months and one year to confirm stability or CT chest to confirm benignity. - Chest X ray- 6 mm nodule of indeterminate etiology RIGHT lower zone periphery.  - No airspace consolidation.  - As no prior radiographs available for comparison follow-up chest radiographs in 6 months and one year to confirm stability or CT chest to confirm benignity.  -patient informed of findings and instructed to f/u with his PCP in 1 month to schedule repeat chest imaging such as CXR and/or CT scan to esnure stability of nodule. Discussed that importance of follow up to ensure nodule does not grow in size and to evaluate for potential for malignancy - Chest X ray- 6 mm nodule of indeterminate etiology RIGHT lower zone periphery.  - No airspace consolidation.  - As no prior radiographs available for comparison follow-up chest radiographs in 6 months and one year to confirm stability or CT chest to confirm benignity.  -patient informed of findings and instructed to f/u with his PCP in 1 month to schedule repeat chest imaging, CT scan to ensure, stability of nodule. Discussed that importance of follow up to ensure nodule does not grow in size and to evaluate for potential for malignancy

## 2023-01-31 NOTE — CONSULT NOTE ADULT - PROBLEM SELECTOR RECOMMENDATION 5
Hgb 12.9, MCV 75.7  - f/u AM iron, TIBC, ferritin, B12, Folate  - f/u CBC Hgb 12.9, MCV 75.7  - f/u AM iron, TIBC, ferritin, B12, Folate  - f/u CBC  -patient has never had a colonoscopy he was instructed to establish care with a GI doctor as an outpatient and schedule a screening colonoscopy within 1-2 months given his age and microcytic anemia

## 2023-01-31 NOTE — ED PROVIDER NOTE - NS ED ATTENDING STATEMENT MOD
This was a shared visit with the LOVE. I reviewed and verified the documentation and independently performed the documented: Consent (Ear)/Introductory Paragraph: The rationale for Mohs was explained to the patient and consent was obtained. The risks, benefits and alternatives to therapy were discussed in detail. Specifically, the risks of ear deformity, infection, scarring, bleeding, prolonged wound healing, incomplete removal, allergy to anesthesia, nerve injury and recurrence were addressed. Prior to the procedure, the treatment site was clearly identified and confirmed by the patient. All components of Universal Protocol/PAUSE Rule completed.

## 2023-01-31 NOTE — CONSULT NOTE ADULT - ASSESSMENT
52 year old male with pmhx of htn, CAP, obesity, chronic diastolic CHF presents for surgery scheduled for tomorrow (2/1) with Dr. Capone for L4-5 Posterior spinal decompression. Medicine is being consulted for surgery clearance.

## 2023-01-31 NOTE — ED ADULT NURSE NOTE - OBJECTIVE STATEMENT
Patient received referred by surgeon to come in for spinal surgery in AM. Patient at this time, in no acute distress, AOx4, ambulatory, safety precautions in place, awaiting evaluation. Patient received referred by surgeon to come in for spinal surgery in AM. Patient at this time in no acute distress, AOx4, ambulatory, safety precautions in place, awaiting evaluation.

## 2023-01-31 NOTE — CONSULT NOTE ADULT - PROBLEM SELECTOR RECOMMENDATION 3
Hold chemical prophylaxis prior to surgery, SCD's okay Blood glucose 282  - pt states he doesn't have a history of diabetes   - continue to monitor Blood glucose 282  - pt states he doesn't have a history of diabetes   - continue to monitor  - f/u AM A1C

## 2023-01-31 NOTE — CONSULT NOTE ADULT - ASSESSMENT
52 year old male with past medical history of CAP, obesity , chronic diastolic chf, htn presenting   for surgery.    IN progress    EKG SR 76bpm with TWI lateral ,  bp 136/78  laying flat on room air no orthopnea , chest xray no effusion or pneumothorax , fu primary recs regarding nodule   continue home medications Coreg, Ace, Aldactone and Statin   check 2decho          52 year old male with past medical history of CAP, obesity , chronic diastolic chf, htn presenting   for surgery scheduled for tomorrow.  Reports he was told by his surgeon to come to ED for surgery scheduled for 2/1. He does not know his cardiologist name ,follows with [facility] had scheduled appt for tomorrow.       EKG as above SR 76bpm with TWI lateral   will attempt to get records from [facility]   laying flat on room air no orthopnea , chest xray no effusion or pneumothorax , fu primary recs regarding nodule   continue home medications Coreg, Ace, Aldactone , bp stable 136/78  continue statin   check 2decho     further risk stratification pending results of above     Monitor and replete electrolytes. Keep K>4.0 and Mg>2.0.  Zunilda Lopez FNP-C  Cardiology NP  [facility] [id number] [phone]         52 year old male with past medical history of CAP, obesity , chronic diastolic chf, htn presenting   for surgery scheduled for tomorrow.  Reports he was told by his surgeon to come to ED for surgery scheduled for 2/1. He does not know his cardiologist name ,follows with [facility] had scheduled appt for tomorrow.   Cardiology consulted for pre op evaluation,.   now planned for L4-L5 posterior spinal decompression.     EKG as above SR 76bpm with TWI lateral   will attempt to get records from [facility]   laying flat on room air no orthopnea , chest xray no effusion or pneumothorax , fu primary recs regarding nodule   continue home medications Coreg, Ace, Aldactone , bp stable 136/78  continue statin   check 2decho     further risk stratification pending results of above     Monitor and replete electrolytes. Keep K>4.0 and Mg>2.0.  Zunilda Lopez FNP-C  Cardiology NP  [facility] [id number] [phone]

## 2023-01-31 NOTE — ED PROVIDER NOTE - PRINCIPAL DIAGNOSIS

## 2023-02-01 LAB
A1C WITH ESTIMATED AVERAGE GLUCOSE RESULT: 7.5 % — HIGH (ref 4–5.6)
ANION GAP SERPL CALC-SCNC: 6 MMOL/L — SIGNIFICANT CHANGE UP (ref 5–17)
ANION GAP SERPL CALC-SCNC: 8 MMOL/L — SIGNIFICANT CHANGE UP (ref 5–17)
APTT BLD: 32.6 SEC — SIGNIFICANT CHANGE UP (ref 27.5–35.5)
BASOPHILS # BLD AUTO: 0.02 K/UL — SIGNIFICANT CHANGE UP (ref 0–0.2)
BASOPHILS NFR BLD AUTO: 0.2 % — SIGNIFICANT CHANGE UP (ref 0–2)
BUN SERPL-MCNC: 16 MG/DL — SIGNIFICANT CHANGE UP (ref 7–23)
BUN SERPL-MCNC: 16 MG/DL — SIGNIFICANT CHANGE UP (ref 7–23)
CALCIUM SERPL-MCNC: 9.1 MG/DL — SIGNIFICANT CHANGE UP (ref 8.5–10.1)
CALCIUM SERPL-MCNC: 9.4 MG/DL — SIGNIFICANT CHANGE UP (ref 8.5–10.1)
CHLORIDE SERPL-SCNC: 102 MMOL/L — SIGNIFICANT CHANGE UP (ref 96–108)
CHLORIDE SERPL-SCNC: 96 MMOL/L — SIGNIFICANT CHANGE UP (ref 96–108)
CO2 SERPL-SCNC: 27 MMOL/L — SIGNIFICANT CHANGE UP (ref 22–31)
CO2 SERPL-SCNC: 28 MMOL/L — SIGNIFICANT CHANGE UP (ref 22–31)
CREAT SERPL-MCNC: 0.98 MG/DL — SIGNIFICANT CHANGE UP (ref 0.5–1.3)
CREAT SERPL-MCNC: 1.3 MG/DL — SIGNIFICANT CHANGE UP (ref 0.5–1.3)
EGFR: 66 ML/MIN/1.73M2 — SIGNIFICANT CHANGE UP
EGFR: 93 ML/MIN/1.73M2 — SIGNIFICANT CHANGE UP
EOSINOPHIL # BLD AUTO: 0.01 K/UL — SIGNIFICANT CHANGE UP (ref 0–0.5)
EOSINOPHIL NFR BLD AUTO: 0.1 % — SIGNIFICANT CHANGE UP (ref 0–6)
ESTIMATED AVERAGE GLUCOSE: 169 MG/DL — HIGH (ref 68–114)
FERRITIN SERPL-MCNC: 157 NG/ML — SIGNIFICANT CHANGE UP (ref 30–400)
FOLATE SERPL-MCNC: 17.8 NG/ML — SIGNIFICANT CHANGE UP
GLUCOSE SERPL-MCNC: 160 MG/DL — HIGH (ref 70–99)
GLUCOSE SERPL-MCNC: 271 MG/DL — HIGH (ref 70–99)
HCT VFR BLD CALC: 40.9 % — SIGNIFICANT CHANGE UP (ref 39–50)
HCT VFR BLD CALC: 44.3 % — SIGNIFICANT CHANGE UP (ref 39–50)
HGB BLD-MCNC: 12.7 G/DL — LOW (ref 13–17)
HGB BLD-MCNC: 13.7 G/DL — SIGNIFICANT CHANGE UP (ref 13–17)
IMM GRANULOCYTES NFR BLD AUTO: 0.6 % — SIGNIFICANT CHANGE UP (ref 0–0.9)
INR BLD: 1.19 RATIO — HIGH (ref 0.88–1.16)
INR BLD: 1.19 RATIO — HIGH (ref 0.88–1.16)
IRON SATN MFR SERPL: 100 UG/DL — SIGNIFICANT CHANGE UP (ref 45–165)
IRON SATN MFR SERPL: 28 % — SIGNIFICANT CHANGE UP (ref 16–55)
LYMPHOCYTES # BLD AUTO: 1.14 K/UL — SIGNIFICANT CHANGE UP (ref 1–3.3)
LYMPHOCYTES # BLD AUTO: 10 % — LOW (ref 13–44)
MCHC RBC-ENTMCNC: 23.3 PG — LOW (ref 27–34)
MCHC RBC-ENTMCNC: 23.5 PG — LOW (ref 27–34)
MCHC RBC-ENTMCNC: 30.9 GM/DL — LOW (ref 32–36)
MCHC RBC-ENTMCNC: 31.1 GM/DL — LOW (ref 32–36)
MCV RBC AUTO: 74.9 FL — LOW (ref 80–100)
MCV RBC AUTO: 76.1 FL — LOW (ref 80–100)
MONOCYTES # BLD AUTO: 0.08 K/UL — SIGNIFICANT CHANGE UP (ref 0–0.9)
MONOCYTES NFR BLD AUTO: 0.7 % — LOW (ref 2–14)
NEUTROPHILS # BLD AUTO: 10.05 K/UL — HIGH (ref 1.8–7.4)
NEUTROPHILS NFR BLD AUTO: 88.4 % — HIGH (ref 43–77)
NRBC # BLD: 0 /100 WBCS — SIGNIFICANT CHANGE UP (ref 0–0)
NRBC # BLD: 0 /100 WBCS — SIGNIFICANT CHANGE UP (ref 0–0)
PLATELET # BLD AUTO: 285 K/UL — SIGNIFICANT CHANGE UP (ref 150–400)
PLATELET # BLD AUTO: 333 K/UL — SIGNIFICANT CHANGE UP (ref 150–400)
POTASSIUM SERPL-MCNC: 4 MMOL/L — SIGNIFICANT CHANGE UP (ref 3.5–5.3)
POTASSIUM SERPL-MCNC: 4.9 MMOL/L — SIGNIFICANT CHANGE UP (ref 3.5–5.3)
POTASSIUM SERPL-SCNC: 4 MMOL/L — SIGNIFICANT CHANGE UP (ref 3.5–5.3)
POTASSIUM SERPL-SCNC: 4.9 MMOL/L — SIGNIFICANT CHANGE UP (ref 3.5–5.3)
PROTHROM AB SERPL-ACNC: 13.9 SEC — HIGH (ref 10.5–13.4)
PROTHROM AB SERPL-ACNC: 13.9 SEC — HIGH (ref 10.5–13.4)
RBC # BLD: 5.46 M/UL — SIGNIFICANT CHANGE UP (ref 4.2–5.8)
RBC # BLD: 5.82 M/UL — HIGH (ref 4.2–5.8)
RBC # FLD: 16.8 % — HIGH (ref 10.3–14.5)
RBC # FLD: 16.9 % — HIGH (ref 10.3–14.5)
SODIUM SERPL-SCNC: 131 MMOL/L — LOW (ref 135–145)
SODIUM SERPL-SCNC: 136 MMOL/L — SIGNIFICANT CHANGE UP (ref 135–145)
TIBC SERPL-MCNC: 362 UG/DL — SIGNIFICANT CHANGE UP (ref 220–430)
UIBC SERPL-MCNC: 262 UG/DL — SIGNIFICANT CHANGE UP (ref 110–370)
VIT B12 SERPL-MCNC: 451 PG/ML — SIGNIFICANT CHANGE UP (ref 232–1245)
WBC # BLD: 11.37 K/UL — HIGH (ref 3.8–10.5)
WBC # BLD: 7.45 K/UL — SIGNIFICANT CHANGE UP (ref 3.8–10.5)
WBC # FLD AUTO: 11.37 K/UL — HIGH (ref 3.8–10.5)
WBC # FLD AUTO: 7.45 K/UL — SIGNIFICANT CHANGE UP (ref 3.8–10.5)

## 2023-02-01 PROCEDURE — 99233 SBSQ HOSP IP/OBS HIGH 50: CPT

## 2023-02-01 PROCEDURE — 93306 TTE W/DOPPLER COMPLETE: CPT | Mod: 26

## 2023-02-01 PROCEDURE — 99232 SBSQ HOSP IP/OBS MODERATE 35: CPT

## 2023-02-01 DEVICE — SURGIFLO MATRIX WITH THROMBIN KIT: Type: IMPLANTABLE DEVICE | Status: FUNCTIONAL

## 2023-02-01 RX ORDER — ONDANSETRON 8 MG/1
4 TABLET, FILM COATED ORAL EVERY 6 HOURS
Refills: 0 | Status: DISCONTINUED | OUTPATIENT
Start: 2023-02-01 | End: 2023-02-04

## 2023-02-01 RX ORDER — BUPIVACAINE 13.3 MG/ML
20 INJECTION, SUSPENSION, LIPOSOMAL INFILTRATION ONCE
Refills: 0 | Status: DISCONTINUED | OUTPATIENT
Start: 2023-02-01 | End: 2023-02-04

## 2023-02-01 RX ORDER — ONDANSETRON 8 MG/1
4 TABLET, FILM COATED ORAL ONCE
Refills: 0 | Status: DISCONTINUED | OUTPATIENT
Start: 2023-02-01 | End: 2023-02-01

## 2023-02-01 RX ORDER — INSULIN LISPRO 100/ML
3 VIAL (ML) SUBCUTANEOUS ONCE
Refills: 0 | Status: COMPLETED | OUTPATIENT
Start: 2023-02-01 | End: 2023-02-01

## 2023-02-01 RX ORDER — SENNA PLUS 8.6 MG/1
2 TABLET ORAL AT BEDTIME
Refills: 0 | Status: DISCONTINUED | OUTPATIENT
Start: 2023-02-01 | End: 2023-02-04

## 2023-02-01 RX ORDER — PANTOPRAZOLE SODIUM 20 MG/1
40 TABLET, DELAYED RELEASE ORAL
Refills: 0 | Status: DISCONTINUED | OUTPATIENT
Start: 2023-02-01 | End: 2023-02-04

## 2023-02-01 RX ORDER — OXYCODONE HYDROCHLORIDE 5 MG/1
10 TABLET ORAL EVERY 6 HOURS
Refills: 0 | Status: DISCONTINUED | OUTPATIENT
Start: 2023-02-01 | End: 2023-02-04

## 2023-02-01 RX ORDER — POLYETHYLENE GLYCOL 3350 17 G/17G
17 POWDER, FOR SOLUTION ORAL DAILY
Refills: 0 | Status: DISCONTINUED | OUTPATIENT
Start: 2023-02-01 | End: 2023-02-04

## 2023-02-01 RX ORDER — ACETAMINOPHEN 500 MG
650 TABLET ORAL EVERY 6 HOURS
Refills: 0 | Status: DISCONTINUED | OUTPATIENT
Start: 2023-02-01 | End: 2023-02-04

## 2023-02-01 RX ORDER — OXYCODONE HYDROCHLORIDE 5 MG/1
5 TABLET ORAL ONCE
Refills: 0 | Status: DISCONTINUED | OUTPATIENT
Start: 2023-02-01 | End: 2023-02-01

## 2023-02-01 RX ORDER — HYDROMORPHONE HYDROCHLORIDE 2 MG/ML
0.5 INJECTION INTRAMUSCULAR; INTRAVENOUS; SUBCUTANEOUS
Refills: 0 | Status: DISCONTINUED | OUTPATIENT
Start: 2023-02-01 | End: 2023-02-01

## 2023-02-01 RX ORDER — SODIUM CHLORIDE 9 MG/ML
1000 INJECTION, SOLUTION INTRAVENOUS
Refills: 0 | Status: DISCONTINUED | OUTPATIENT
Start: 2023-02-01 | End: 2023-02-01

## 2023-02-01 RX ORDER — CYCLOBENZAPRINE HYDROCHLORIDE 10 MG/1
10 TABLET, FILM COATED ORAL EVERY 8 HOURS
Refills: 0 | Status: DISCONTINUED | OUTPATIENT
Start: 2023-02-01 | End: 2023-02-04

## 2023-02-01 RX ORDER — VANCOMYCIN HCL 1 G
1750 VIAL (EA) INTRAVENOUS ONCE
Refills: 0 | Status: COMPLETED | OUTPATIENT
Start: 2023-02-01 | End: 2023-02-01

## 2023-02-01 RX ORDER — OXYCODONE HYDROCHLORIDE 5 MG/1
5 TABLET ORAL EVERY 6 HOURS
Refills: 0 | Status: DISCONTINUED | OUTPATIENT
Start: 2023-02-01 | End: 2023-02-04

## 2023-02-01 RX ADMIN — CARVEDILOL PHOSPHATE 12.5 MILLIGRAM(S): 80 CAPSULE, EXTENDED RELEASE ORAL at 05:38

## 2023-02-01 RX ADMIN — SODIUM CHLORIDE 75 MILLILITER(S): 9 INJECTION, SOLUTION INTRAVENOUS at 22:01

## 2023-02-01 RX ADMIN — HYDROMORPHONE HYDROCHLORIDE 0.5 MILLIGRAM(S): 2 INJECTION INTRAMUSCULAR; INTRAVENOUS; SUBCUTANEOUS at 20:19

## 2023-02-01 RX ADMIN — HYDROMORPHONE HYDROCHLORIDE 0.5 MILLIGRAM(S): 2 INJECTION INTRAMUSCULAR; INTRAVENOUS; SUBCUTANEOUS at 20:30

## 2023-02-01 RX ADMIN — SODIUM CHLORIDE 75 MILLILITER(S): 9 INJECTION, SOLUTION INTRAVENOUS at 02:40

## 2023-02-01 RX ADMIN — SPIRONOLACTONE 25 MILLIGRAM(S): 25 TABLET, FILM COATED ORAL at 05:38

## 2023-02-01 RX ADMIN — Medication 3 UNIT(S): at 19:57

## 2023-02-01 RX ADMIN — SODIUM CHLORIDE 75 MILLILITER(S): 9 INJECTION, SOLUTION INTRAVENOUS at 19:56

## 2023-02-01 RX ADMIN — LISINOPRIL 20 MILLIGRAM(S): 2.5 TABLET ORAL at 05:38

## 2023-02-01 RX ADMIN — HYDROMORPHONE HYDROCHLORIDE 0.5 MILLIGRAM(S): 2 INJECTION INTRAMUSCULAR; INTRAVENOUS; SUBCUTANEOUS at 21:01

## 2023-02-01 RX ADMIN — OXYCODONE HYDROCHLORIDE 10 MILLIGRAM(S): 5 TABLET ORAL at 23:15

## 2023-02-01 RX ADMIN — OXYCODONE HYDROCHLORIDE 10 MILLIGRAM(S): 5 TABLET ORAL at 23:30

## 2023-02-01 RX ADMIN — HYDROMORPHONE HYDROCHLORIDE 0.5 MILLIGRAM(S): 2 INJECTION INTRAMUSCULAR; INTRAVENOUS; SUBCUTANEOUS at 20:46

## 2023-02-01 NOTE — PROGRESS NOTE ADULT - SUBJECTIVE AND OBJECTIVE BOX
Patient is a 52y old  Male who presents with a chief complaint of Lumbar Radiculopathy (01 Feb 2023 07:15)      INTERVAL HPI/OVERNIGHT EVENTS:    MEDICATIONS  (STANDING):  atorvastatin 20 milliGRAM(s) Oral at bedtime  carvedilol 12.5 milliGRAM(s) Oral every 12 hours  dextrose 5%. 1000 milliLiter(s) (50 mL/Hr) IV Continuous <Continuous>  dextrose 5%. 1000 milliLiter(s) (100 mL/Hr) IV Continuous <Continuous>  dextrose 50% Injectable 25 Gram(s) IV Push once  dextrose 50% Injectable 12.5 Gram(s) IV Push once  dextrose 50% Injectable 25 Gram(s) IV Push once  glucagon  Injectable 1 milliGRAM(s) IntraMuscular once  influenza   Vaccine 0.5 milliLiter(s) IntraMuscular once  insulin lispro (ADMELOG) corrective regimen sliding scale   SubCutaneous three times a day before meals  insulin lispro (ADMELOG) corrective regimen sliding scale   SubCutaneous at bedtime  lactated ringers. 1000 milliLiter(s) (75 mL/Hr) IV Continuous <Continuous>  lisinopril 20 milliGRAM(s) Oral daily  spironolactone 25 milliGRAM(s) Oral daily    MEDICATIONS  (PRN):  acetaminophen     Tablet .. 650 milliGRAM(s) Oral every 6 hours PRN Mild Pain (1 - 3)  dextrose Oral Gel 15 Gram(s) Oral once PRN Blood Glucose LESS THAN 70 milliGRAM(s)/deciliter  loperamide 2 milliGRAM(s) Oral Once PRN Diarrhea  oxyCODONE    IR 10 milliGRAM(s) Oral every 4 hours PRN Severe Pain (7 - 10)  oxyCODONE    IR 5 milliGRAM(s) Oral every 4 hours PRN Moderate Pain (4 - 6)      Allergies    morphine (Unknown)  pain medicine, name unknown (Unknown)  penicillins (Unknown)    Intolerances        REVIEW OF SYSTEMS:  CONSTITUTIONAL: No fever or chills  HEENT:  No headache, no sore throat  RESPIRATORY: No cough, wheezing, or shortness of breath  CARDIOVASCULAR: No chest pain, palpitations  GASTROINTESTINAL: No abd pain, nausea, vomiting, or diarrhea  GENITOURINARY: No dysuria, frequency, or hematuria  NEUROLOGICAL: no focal weakness or dizziness  MUSCULOSKELETAL: no myalgias     Vital Signs Last 24 Hrs  T(C): 36.2 (01 Feb 2023 05:07), Max: 36.7 (31 Jan 2023 13:31)  T(F): 97.1 (01 Feb 2023 05:07), Max: 98.1 (31 Jan 2023 19:38)  HR: 72 (01 Feb 2023 05:07) (72 - 84)  BP: 125/88 (01 Feb 2023 05:35) (125/79 - 136/78)  BP(mean): --  RR: 17 (01 Feb 2023 05:07) (16 - 18)  SpO2: 92% (01 Feb 2023 05:07) (92% - 98%)    Parameters below as of 01 Feb 2023 05:07  Patient On (Oxygen Delivery Method): room air        PHYSICAL EXAM:  GENERAL: NAD  HEENT:  anicteric, moist mucous membranes  CHEST/LUNG:  CTA b/l, no rales, wheezes, or rhonchi  HEART:  RRR, S1, S2  ABDOMEN:  BS+, soft, nontender, nondistended  EXTREMITIES: no edema, cyanosis, or calf tenderness  NERVOUS SYSTEM: answers questions and follows commands appropriately    LABS:                        12.7   7.45  )-----------( 285      ( 01 Feb 2023 05:25 )             40.9     CBC Full  -  ( 01 Feb 2023 05:25 )  WBC Count : 7.45 K/uL  Hemoglobin : 12.7 g/dL  Hematocrit : 40.9 %  Platelet Count - Automated : 285 K/uL  Mean Cell Volume : 74.9 fl  Mean Cell Hemoglobin : 23.3 pg  Mean Cell Hemoglobin Concentration : 31.1 gm/dL  Auto Neutrophil # : x  Auto Lymphocyte # : x  Auto Monocyte # : x  Auto Eosinophil # : x  Auto Basophil # : x  Auto Neutrophil % : x  Auto Lymphocyte % : x  Auto Monocyte % : x  Auto Eosinophil % : x  Auto Basophil % : x    01 Feb 2023 05:25    136    |  102    |  16     ----------------------------<  160    4.0     |  28     |  0.98     Ca    9.4        01 Feb 2023 05:25      PT/INR - ( 01 Feb 2023 05:25 )   PT: 13.9 sec;   INR: 1.19 ratio         PTT - ( 01 Feb 2023 05:25 )  PTT:32.6 sec    CAPILLARY BLOOD GLUCOSE      POCT Blood Glucose.: 165 mg/dL (01 Feb 2023 08:29)  POCT Blood Glucose.: 177 mg/dL (31 Jan 2023 21:12)          RADIOLOGY & ADDITIONAL TESTS:    Personally reviewed.     Consultant(s) Notes Reviewed:  [x] YES  [ ] NO     Patient is a 52y old  Male who presents with a chief complaint of intermittent numbness/tingling/weakness in right leg      INTERVAL HPI/OVERNIGHT EVENTS: Pt reports intermittent numbness/tingling/weakness in the right leg. Denies pain currently. Denies fever, chills, SOB, CP, abd pain, n/v.    MEDICATIONS  (STANDING):  atorvastatin 20 milliGRAM(s) Oral at bedtime  carvedilol 12.5 milliGRAM(s) Oral every 12 hours  dextrose 5%. 1000 milliLiter(s) (50 mL/Hr) IV Continuous <Continuous>  dextrose 5%. 1000 milliLiter(s) (100 mL/Hr) IV Continuous <Continuous>  dextrose 50% Injectable 25 Gram(s) IV Push once  dextrose 50% Injectable 12.5 Gram(s) IV Push once  dextrose 50% Injectable 25 Gram(s) IV Push once  glucagon  Injectable 1 milliGRAM(s) IntraMuscular once  influenza   Vaccine 0.5 milliLiter(s) IntraMuscular once  insulin lispro (ADMELOG) corrective regimen sliding scale   SubCutaneous three times a day before meals  insulin lispro (ADMELOG) corrective regimen sliding scale   SubCutaneous at bedtime  lactated ringers. 1000 milliLiter(s) (75 mL/Hr) IV Continuous <Continuous>  lisinopril 20 milliGRAM(s) Oral daily  spironolactone 25 milliGRAM(s) Oral daily    MEDICATIONS  (PRN):  acetaminophen     Tablet .. 650 milliGRAM(s) Oral every 6 hours PRN Mild Pain (1 - 3)  dextrose Oral Gel 15 Gram(s) Oral once PRN Blood Glucose LESS THAN 70 milliGRAM(s)/deciliter  loperamide 2 milliGRAM(s) Oral Once PRN Diarrhea  oxyCODONE    IR 10 milliGRAM(s) Oral every 4 hours PRN Severe Pain (7 - 10)  oxyCODONE    IR 5 milliGRAM(s) Oral every 4 hours PRN Moderate Pain (4 - 6)      Allergies    morphine (Unknown)  pain medicine, name unknown (Unknown)  penicillins (Unknown)    Intolerances        REVIEW OF SYSTEMS:  CONSTITUTIONAL: No fever or chills  HEENT:  No headache, no sore throat  RESPIRATORY: No cough, wheezing, or shortness of breath  CARDIOVASCULAR: No chest pain, palpitations  GASTROINTESTINAL: No abd pain, nausea, vomiting, or diarrhea  GENITOURINARY: No dysuria, frequency, or hematuria  NEUROLOGICAL: +intermittent numbness/tingling/weakness of right LE ; no dizziness  MUSCULOSKELETAL: no myalgias     Vital Signs Last 24 Hrs  T(C): 36.2 (01 Feb 2023 05:07), Max: 36.7 (31 Jan 2023 13:31)  T(F): 97.1 (01 Feb 2023 05:07), Max: 98.1 (31 Jan 2023 19:38)  HR: 72 (01 Feb 2023 05:07) (72 - 84)  BP: 125/88 (01 Feb 2023 05:35) (125/79 - 136/78)  BP(mean): --  RR: 17 (01 Feb 2023 05:07) (16 - 18)  SpO2: 92% (01 Feb 2023 05:07) (92% - 98%)    Parameters below as of 01 Feb 2023 05:07  Patient On (Oxygen Delivery Method): room air        PHYSICAL EXAM:  GENERAL: NAD, morbidly obese  HEENT:  anicteric, moist mucous membranes  CHEST/LUNG:  CTA b/l, no rales, wheezes, or rhonchi  HEART:  RRR, S1, S2  ABDOMEN:  BS+, soft, nontender, nondistended  EXTREMITIES: no edema, cyanosis, or calf tenderness  NERVOUS SYSTEM: answers questions and follows commands appropriately ; sensation to touch intact b/l LEs; strength 5/5 in b/l LEs except for 4+/5 on right plantarflexion    LABS:                        12.7   7.45  )-----------( 285      ( 01 Feb 2023 05:25 )             40.9     CBC Full  -  ( 01 Feb 2023 05:25 )  WBC Count : 7.45 K/uL  Hemoglobin : 12.7 g/dL  Hematocrit : 40.9 %  Platelet Count - Automated : 285 K/uL  Mean Cell Volume : 74.9 fl  Mean Cell Hemoglobin : 23.3 pg  Mean Cell Hemoglobin Concentration : 31.1 gm/dL  Auto Neutrophil # : x  Auto Lymphocyte # : x  Auto Monocyte # : x  Auto Eosinophil # : x  Auto Basophil # : x  Auto Neutrophil % : x  Auto Lymphocyte % : x  Auto Monocyte % : x  Auto Eosinophil % : x  Auto Basophil % : x    01 Feb 2023 05:25    136    |  102    |  16     ----------------------------<  160    4.0     |  28     |  0.98     Ca    9.4        01 Feb 2023 05:25      PT/INR - ( 01 Feb 2023 05:25 )   PT: 13.9 sec;   INR: 1.19 ratio         PTT - ( 01 Feb 2023 05:25 )  PTT:32.6 sec    CAPILLARY BLOOD GLUCOSE      POCT Blood Glucose.: 165 mg/dL (01 Feb 2023 08:29)  POCT Blood Glucose.: 177 mg/dL (31 Jan 2023 21:12)          RADIOLOGY & ADDITIONAL TESTS:    Personally reviewed.     Consultant(s) Notes Reviewed:  [x] YES  [ ] NO

## 2023-02-01 NOTE — PROGRESS NOTE ADULT - SUBJECTIVE AND OBJECTIVE BOX
Seen and examined at bedside. No acute complaints at this time. Pain well controlled. Denies weakness, numbness or tingling. Denies chest pain, shortness of breath, nausea or vomiting. Plan for OR today with Dr. julian     PE:  Vital Signs Last 24 Hrs  T(C): 36.2 (02-01-23 @ 05:07), Max: 36.7 (01-31-23 @ 13:31)  T(F): 97.1 (02-01-23 @ 05:07), Max: 98.1 (01-31-23 @ 19:38)  HR: 72 (02-01-23 @ 05:07) (72 - 84)  BP: 125/88 (02-01-23 @ 05:35) (125/79 - 136/78)  BP(mean): --  RR: 17 (02-01-23 @ 05:07) (16 - 18)  SpO2: 92% (02-01-23 @ 05:07) (92% - 98%)    General: NAD, resting comforatbly in bed      2+ radial pulses  2+ DP Pulses    Motor:                   C5                C6              C7               C8           T1   R             5/5                5/5            5/5              5/5          5/5  L             5/5                5/5            5/5              5/5          5/5                    L2                  L3             L4              L5            S1  R            5/5                5/5             5/5            5/5          4/5  L             5/5                5/5            5/5            5/5          5/5    Sensory:            C5         C6         C7      C8       T1        (0=absent, 1=impaired, 2=normal, NT=not testable)  R         2            2           2        2         2  L          2            2           2        2         2               L2          L3         L4      L5       S1         (0=absent, 1=impaired, 2=normal, NT=not testable)  R         2            2            2        2        2  L          2            2           2        2         2          PT/INR - ( 01 Feb 2023 05:25 )   PT: 13.9 sec;   INR: 1.19 ratio         PTT - ( 01 Feb 2023 05:25 )  PTT:32.6 sec    A/P:  52y m with lumbar radiculopathy, Plan for OR today with Dr. Julian  - NPO  -FU Preop labs   - SCDs for DVT ppx, no chemical ppx   -WBAT  -Pain Control  - FU TTE   - Please document medical and cardio clearance pending TTE   - Discussed with Dr. Julian who agrees with the above plan  Seen and examined at bedside. No acute complaints at this time. Pain well controlled. Denies chest pain, shortness of breath, nausea or vomiting. Plan for OR today with Dr. Capone     PE:  Vital Signs Last 24 Hrs  T(C): 36.2 (02-01-23 @ 05:07), Max: 36.7 (01-31-23 @ 13:31)  T(F): 97.1 (02-01-23 @ 05:07), Max: 98.1 (01-31-23 @ 19:38)  HR: 72 (02-01-23 @ 05:07) (72 - 84)  BP: 125/88 (02-01-23 @ 05:35) (125/79 - 136/78)  BP(mean): --  RR: 17 (02-01-23 @ 05:07) (16 - 18)  SpO2: 92% (02-01-23 @ 05:07) (92% - 98%)    General: NAD, resting comforatbly in bed      2+ radial pulses  2+ DP Pulses    Motor:                   C5                C6              C7               C8           T1   R             5/5                5/5            5/5              5/5          5/5  L             5/5                5/5            5/5              5/5          5/5                    L2                  L3             L4              L5            S1  R            5/5                5/5             5/5            5/5          4/5  L             5/5                5/5            5/5            5/5          5/5    Sensory:            C5         C6         C7      C8       T1        (0=absent, 1=impaired, 2=normal, NT=not testable)  R         2            2           2        2         2  L          2            2           2        2         2               L2          L3         L4      L5       S1         (0=absent, 1=impaired, 2=normal, NT=not testable)  R         2            2            2        2        2  L          2            2           2        2         2          PT/INR - ( 01 Feb 2023 05:25 )   PT: 13.9 sec;   INR: 1.19 ratio         PTT - ( 01 Feb 2023 05:25 )  PTT:32.6 sec    A/P:  52y m with severe L4-L5 central canal stenosis,    OR today  - NPO  -FU Preop labs   - SCDs for DVT ppx, no chemical ppx   -WBAT  -Pain Control  - FU TTE   - Please document medical and cardio clearance pending TTE   - Discussed with Dr. Capone who agrees with the above plan

## 2023-02-01 NOTE — PROGRESS NOTE ADULT - SUBJECTIVE AND OBJECTIVE BOX
DOCUMENTATION IN PROGRESS    [facility] -- Shruthi Cedillo Wachsman, Pannella, Patel, Savella, Goodger  Office # [phone]    Follow Up:  cardiac clearance     Subjective/Observations:     REVIEW OF SYSTEMS: All other review of systems is negative unless indicated above  PAST MEDICAL & SURGICAL HISTORY:  Hypertension      CAP (community acquired pneumonia)      No significant past surgical history        MEDICATIONS  (STANDING):  atorvastatin 20 milliGRAM(s) Oral at bedtime  carvedilol 12.5 milliGRAM(s) Oral every 12 hours  dextrose 5%. 1000 milliLiter(s) (50 mL/Hr) IV Continuous <Continuous>  dextrose 5%. 1000 milliLiter(s) (100 mL/Hr) IV Continuous <Continuous>  dextrose 50% Injectable 25 Gram(s) IV Push once  dextrose 50% Injectable 12.5 Gram(s) IV Push once  dextrose 50% Injectable 25 Gram(s) IV Push once  glucagon  Injectable 1 milliGRAM(s) IntraMuscular once  influenza   Vaccine 0.5 milliLiter(s) IntraMuscular once  insulin lispro (ADMELOG) corrective regimen sliding scale   SubCutaneous three times a day before meals  insulin lispro (ADMELOG) corrective regimen sliding scale   SubCutaneous at bedtime  lactated ringers. 1000 milliLiter(s) (75 mL/Hr) IV Continuous <Continuous>  lisinopril 20 milliGRAM(s) Oral daily  spironolactone 25 milliGRAM(s) Oral daily    MEDICATIONS  (PRN):  acetaminophen     Tablet .. 650 milliGRAM(s) Oral every 6 hours PRN Mild Pain (1 - 3)  dextrose Oral Gel 15 Gram(s) Oral once PRN Blood Glucose LESS THAN 70 milliGRAM(s)/deciliter  loperamide 2 milliGRAM(s) Oral Once PRN Diarrhea  oxyCODONE    IR 10 milliGRAM(s) Oral every 4 hours PRN Severe Pain (7 - 10)  oxyCODONE    IR 5 milliGRAM(s) Oral every 4 hours PRN Moderate Pain (4 - 6)    Allergies    morphine (Unknown)  pain medicine, name unknown (Unknown)  penicillins (Unknown)    Intolerances      Vital Signs Last 24 Hrs  T(C): 36.2 (01 Feb 2023 05:07), Max: 36.7 (31 Jan 2023 13:31)  T(F): 97.1 (01 Feb 2023 05:07), Max: 98.1 (31 Jan 2023 19:38)  HR: 72 (01 Feb 2023 05:07) (72 - 84)  BP: 125/88 (01 Feb 2023 05:35) (125/79 - 136/78)  BP(mean): --  RR: 17 (01 Feb 2023 05:07) (16 - 18)  SpO2: 92% (01 Feb 2023 05:07) (92% - 98%)    Parameters below as of 01 Feb 2023 05:07  Patient On (Oxygen Delivery Method): room air      I&O's Summary    Weight (kg): 127 (01-31 @ 13:31)  TELE:   PHYSICAL EXAM:  Constitutional: NAD, awake and alert, well-developed  HEENT: Moist Mucous Membranes, Anicteric  Pulmonary: Non-labored, breath sounds are clear bilaterally, No wheezing, rales or rhonchi  Cardiovascular: Regular, S1 and S2, No murmurs, rubs, gallops or clicks  Gastrointestinal: Bowel Sounds present, soft, nontender.   Lymph: No peripheral edema. No lymphadenopathy.  Skin: No visible rashes or ulcers.  Psych:  Mood & affect appropriate  LABS: All Labs Reviewed:                        12.7   7.45  )-----------( 285      ( 01 Feb 2023 05:25 )             40.9                         12.9   7.71  )-----------( 311      ( 31 Jan 2023 14:10 )             42.0     01 Feb 2023 05:25    136    |  102    |  16     ----------------------------<  160    4.0     |  28     |  0.98   31 Jan 2023 14:10    137    |  103    |  20     ----------------------------<  282    4.6     |  27     |  1.50     Ca    9.4        01 Feb 2023 05:25  Ca    9.7        31 Jan 2023 14:10      PT/INR - ( 01 Feb 2023 05:25 )   PT: 13.9 sec;   INR: 1.19 ratio         PTT - ( 01 Feb 2023 05:25 )  PTT:32.6 sec      12 Lead ECG:   Ventricular Rate 76 BPM    Atrial Rate 76 BPM    P-R Interval 164 ms    QRS Duration 100 ms    Q-T Interval 360 ms    QTC Calculation(Bazett) 405 ms    P Axis 38 degrees    R Axis -4 degrees    T Axis 68 degrees    Diagnosis Line Normal sinus rhythm  Minimal voltage criteria for LVH, may be normal variant ( [place] product )  T wave abnormality, consider lateral ischemia  Abnormal ECG  No previous ECGs available  Confirmed by Chidi Lopez MD ([id number]) on 1/31/2023 4:05:19 PM (01-31-23 @ 14:21)        [facility] -- Shruthi Cedillo,  Jessica, Edwin Diamond Savella, Goodger  Office # [phone]    Follow Up:  cardiac clearance     Subjective/Observations: seen and examined, awake, alert, resting comfortably in bed, denies chest pain, dyspnea, palpitations or dizziness, orthopnea and PND. Tolerating room air. IVF infusing     REVIEW OF SYSTEMS: All other review of systems is negative unless indicated above  PAST MEDICAL & SURGICAL HISTORY:  Hypertension      CAP (community acquired pneumonia)      No significant past surgical history        MEDICATIONS  (STANDING):  atorvastatin 20 milliGRAM(s) Oral at bedtime  carvedilol 12.5 milliGRAM(s) Oral every 12 hours  dextrose 5%. 1000 milliLiter(s) (50 mL/Hr) IV Continuous <Continuous>  dextrose 5%. 1000 milliLiter(s) (100 mL/Hr) IV Continuous <Continuous>  dextrose 50% Injectable 25 Gram(s) IV Push once  dextrose 50% Injectable 12.5 Gram(s) IV Push once  dextrose 50% Injectable 25 Gram(s) IV Push once  glucagon  Injectable 1 milliGRAM(s) IntraMuscular once  influenza   Vaccine 0.5 milliLiter(s) IntraMuscular once  insulin lispro (ADMELOG) corrective regimen sliding scale   SubCutaneous three times a day before meals  insulin lispro (ADMELOG) corrective regimen sliding scale   SubCutaneous at bedtime  lactated ringers. 1000 milliLiter(s) (75 mL/Hr) IV Continuous <Continuous>  lisinopril 20 milliGRAM(s) Oral daily  spironolactone 25 milliGRAM(s) Oral daily    MEDICATIONS  (PRN):  acetaminophen     Tablet .. 650 milliGRAM(s) Oral every 6 hours PRN Mild Pain (1 - 3)  dextrose Oral Gel 15 Gram(s) Oral once PRN Blood Glucose LESS THAN 70 milliGRAM(s)/deciliter  loperamide 2 milliGRAM(s) Oral Once PRN Diarrhea  oxyCODONE    IR 10 milliGRAM(s) Oral every 4 hours PRN Severe Pain (7 - 10)  oxyCODONE    IR 5 milliGRAM(s) Oral every 4 hours PRN Moderate Pain (4 - 6)    Allergies    morphine (Unknown)  pain medicine, name unknown (Unknown)  penicillins (Unknown)    Intolerances      Vital Signs Last 24 Hrs  T(C): 36.2 (01 Feb 2023 05:07), Max: 36.7 (31 Jan 2023 13:31)  T(F): 97.1 (01 Feb 2023 05:07), Max: 98.1 (31 Jan 2023 19:38)  HR: 72 (01 Feb 2023 05:07) (72 - 84)  BP: 125/88 (01 Feb 2023 05:35) (125/79 - 136/78)  BP(mean): --  RR: 17 (01 Feb 2023 05:07) (16 - 18)  SpO2: 92% (01 Feb 2023 05:07) (92% - 98%)    Parameters below as of 01 Feb 2023 05:07  Patient On (Oxygen Delivery Method): room air      I&O's Summary    Weight (kg): 127 (01-31 @ 13:31)  TELE: Not on telemetry   PHYSICAL EXAM:  Constitutional: NAD, awake and alert, obese  HEENT: Moist Mucous Membranes, Anicteric  Pulmonary: Non-labored, breath sounds are clear bilaterally, No wheezing, rales or rhonchi  Cardiovascular: Regular, S1 and S2, No murmurs, rubs, gallops or clicks  Gastrointestinal: Bowel Sounds present, soft, nontender.   Lymph: No peripheral edema. No lymphadenopathy.  Skin: No visible rashes or ulcers.  Psych:  Mood & affect appropriate  LABS: All Labs Reviewed:                        12.7   7.45  )-----------( 285      ( 01 Feb 2023 05:25 )             40.9                         12.9   7.71  )-----------( 311      ( 31 Jan 2023 14:10 )             42.0     01 Feb 2023 05:25    136    |  102    |  16     ----------------------------<  160    4.0     |  28     |  0.98   31 Jan 2023 14:10    137    |  103    |  20     ----------------------------<  282    4.6     |  27     |  1.50     Ca    9.4        01 Feb 2023 05:25  Ca    9.7        31 Jan 2023 14:10      PT/INR - ( 01 Feb 2023 05:25 )   PT: 13.9 sec;   INR: 1.19 ratio         PTT - ( 01 Feb 2023 05:25 )  PTT:32.6 sec      12 Lead ECG:   Ventricular Rate 76 BPM    Atrial Rate 76 BPM    P-R Interval 164 ms    QRS Duration 100 ms    Q-T Interval 360 ms    QTC Calculation(Bazett) 405 ms    P Axis 38 degrees    R Axis -4 degrees    T Axis 68 degrees    Diagnosis Line Normal sinus rhythm  Minimal voltage criteria for LVH, may be normal variant ( [place] product )  T wave abnormality, consider lateral ischemia  Abnormal ECG  No previous ECGs available  Confirmed by Chidi Lopez MD ([id number]) on 1/31/2023 4:05:19 PM (01-31-23 @ 14:21)

## 2023-02-01 NOTE — PROGRESS NOTE ADULT - ATTENDING COMMENTS
Above note reviewed. Agree with above.    The risks and benefits of operative versus nonoperative management were discussed with the patient in detail including but not limited to infection, bleeding, wound complication, persistent or worsening or new radicular symptoms, persistent or new neurologic deficit from neural injury including paralysis, persistent or new back pain, dural tear with cerebrospinal fluid leak, need for future surgery, adjacent segment disease, stroke, blindness, death and medical complications such as pneumonia or organ failure. Risks associated with anesthesia were discussed including but not limited to myocardial infarction, stroke and death. Risks and benefits of blood transfusion were discussed including but was not limited to blood reaction and infection. Questions were sought and answered satisfactorily. After understanding risks, benefits, and alternatives to surgical management, informed consent was obtained.    Kashif Capone DO  Orthopedic and Spine Surgeon  [facility]

## 2023-02-01 NOTE — PROGRESS NOTE ADULT - ASSESSMENT
53yo M w/ PMH of HTN, CAP, obesity, chronic diastolic CHF presents for surgery scheduled for 2/1 with Dr. Capone for L4-5 Posterior spinal decompression. Medicine is being consulted for pre-surgical optimization.      Problem/Recommendation - 1:  ·  Problem: Lumbar radiculopathy, right.   ·  Recommendation: Pt presents with right sided lumbar radiculopathy and is planned for L4-5 posterior spinal decompression  - no cord compression or cauda equina symptoms or any other red flag signs  - f/up preop labs  - Hold chemical DVT prophylaxis  - Dr. Capone (surgery) following  - Dr. Lopez (cardio) for clearance  - patient is an intermediate risk patient for an intermediate risk procedure and is medically optimized for the procedure  - cardiac clearance to be documented separately.     Problem/Recommendation - 2:  ·  Problem: Hypertension.   ·  Recommendation: c/w carvedilol, and spironolactone.  - hold lisinopril tomorrow unless hypertensive     Problem/Recommendation - 3:  ·  Problem: Blood glucose elevated.   ·  Recommendation: Blood glucose 282  - pt states he doesn't have a history of diabetes   - continue to monitor  - f/u AM A1C.     Problem/Recommendation - 4:  ·  Problem: Chronic kidney disease, unspecified CKD stage.   ·  Recommendation: Cr 1.5  - avoid nephrotoxic meds  -monitor Cr.     Problem/Recommendation - 5:  ·  Problem: Microcytic anemia.   ·  Recommendation: Hgb 12.9, MCV 75.7  - f/u AM iron, TIBC, ferritin, B12, Folate  - f/u CBC  -patient has never had a colonoscopy he was instructed to establish care with a GI doctor as an outpatient and schedule a screening colonoscopy within 1-2 months given his age and microcytic anemia.     Problem/Recommendation - 6:  ·  Problem: Pulmonary nodule.   ·  Recommendation: - Chest X ray- 6 mm nodule of indeterminate etiology RIGHT lower zone periphery.  - No airspace consolidation.  - As no prior radiographs available for comparison follow-up chest radiographs in 6 months and one year to confirm stability or CT chest to confirm benignity.  -patient informed of findings and instructed to f/u with his PCP in 1 month to schedule repeat chest imaging, CT scan to ensure, stability of nodule. Discussed that importance of follow up to ensure nodule does not grow in size and to evaluate for potential for malignancy.     Problem/Recommendation - 7:  ·  Problem: Need for prophylactic measure.   ·  Recommendation: Hold chemical prophylaxis prior to surgery, SCD's okay. 53yo M w/ PMH of HTN, CAP, obesity, chronic diastolic CHF presents for surgery scheduled for 2/1 with Dr. Capone for L4-5 Posterior spinal decompression. Medicine is being consulted for pre-surgical optimization.      Problem/Recommendation - 1:  ·  Problem: Lumbar radiculopathy, right.   ·  Recommendation: Pt presents with right sided lumbar radiculopathy and is planned for L4-5 posterior spinal decompression  - no cord compression or cauda equina symptoms or any other red flag signs  - f/up preop labs  - Hold chemical DVT prophylaxis  - Dr. Capone (surgery) following  - Dr. Lopez (cardio) for clearance  - patient is an intermediate risk patient for an intermediate risk procedure and is medically optimized for the procedure  - at risk for undiagnosed INESSA and anesthesia should take necessary precautions  - cardiac clearance to be documented separately.     Problem/Recommendation - 2:  ·  Problem: Hypertension.   ·  Recommendation: c/w carvedilol, and spironolactone.  - hold lisinopril tomorrow unless hypertensive     Problem/Recommendation - 3:  ·  Problem: Blood glucose elevated.   ·  Recommendation: Blood glucose 282  - pt states he doesn't have a history of diabetes   - continue to monitor  - f/u AM A1C.     Problem/Recommendation - 4:  ·  Problem: Chronic kidney disease, unspecified CKD stage.   ·  Recommendation: Cr 1.5  - avoid nephrotoxic meds  -monitor Cr.     Problem/Recommendation - 5:  ·  Problem: Microcytic anemia.   ·  Recommendation: Hgb 12.9, MCV 75.7  - f/u AM iron, TIBC, ferritin, B12, Folate  - f/u CBC  -patient has never had a colonoscopy he was instructed to establish care with a GI doctor as an outpatient and schedule a screening colonoscopy within 1-2 months given his age and microcytic anemia.     Problem/Recommendation - 6:  ·  Problem: Pulmonary nodule.   ·  Recommendation: - Chest X ray- 6 mm nodule of indeterminate etiology RIGHT lower zone periphery.  - No airspace consolidation.  - As no prior radiographs available for comparison follow-up chest radiographs in 6 months and one year to confirm stability or CT chest to confirm benignity.  -patient informed of findings and instructed to f/u with his PCP in 1 month to schedule repeat chest imaging, CT scan to ensure, stability of nodule. Discussed that importance of follow up to ensure nodule does not grow in size and to evaluate for potential for malignancy.     Problem/Recommendation - 7:  ·  Problem: Need for prophylactic measure.   ·  Recommendation: Hold chemical prophylaxis prior to surgery, SCD's okay. 53yo M w/ PMH of HTN, CAP, morbid obesity, chronic diastolic CHF presents for surgery scheduled for 2/1 with Dr. Capone for L4-5 Posterior spinal decompression. Medicine is being consulted for pre-surgical optimization.      Problem/Recommendation - 1:  ·  Problem: Lumbar radiculopathy, right.   ·  Recommendation: Pt presents with right sided lumbar radiculopathy and is planned for L4-5 posterior spinal decompression  - no cord compression or cauda equina symptoms or any other red flag signs  - f/up preop labs  - Hold chemical DVT prophylaxis  - Dr. Capone (surgery) following  - Dr. Alexandrea sanders (cardio), recs appreciated  - patient is an intermediate risk patient for an intermediate risk procedure and is medically optimized for the procedure  - at risk for undiagnosed INESSA and anesthesia should take necessary precautions     Problem/Recommendation - 2:  ·  Problem: Hypertension.   ·  Recommendation: c/w carvedilol, and spironolactone.  - hold lisinopril tomorrow unless hypertensive     Problem/Recommendation - 3:  ·  Problem: newly diagnosed Type 2 DM.   ·  Recommendation: Blood glucose 282  - pt states he does not have a history of diabetes   - continue to monitor  - Hgb A1c of 7.5%  - diabetes education/teaching - consult diabetes NP team  - FSG q6h while NPO and q AC&HS post-operatively with low-dose lispro ISS for now  -      Problem/Recommendation - 4:  ·  Problem: Chronic kidney disease, unspecified CKD stage.   ·  Recommendation: Cr 1.5  - avoid nephrotoxic meds  -monitor Cr.     Problem/Recommendation - 5:  ·  Problem: Microcytic anemia.   ·  Recommendation: Hgb 12.9, MCV 75.7  - f/u AM iron, TIBC, ferritin, B12, Folate  - f/u CBC  - patient has never had a colonoscopy he was instructed to establish care with a GI doctor as an outpatient and schedule a screening colonoscopy within 1-2 months given his age and microcytic anemia.     Problem/Recommendation - 6:  ·  Problem: Pulmonary nodule.   ·  Recommendation: - Chest X ray- 6 mm nodule of indeterminate etiology RIGHT lower zone periphery.  - No airspace consolidation.  - As no prior radiographs available for comparison follow-up chest radiographs in 6 months and one year to confirm stability or CT chest to confirm benignity.  -patient informed of findings and instructed to f/u with his PCP in 1 month to schedule repeat chest imaging, CT scan to ensure, stability of nodule. Discussed that importance of follow up to ensure nodule does not grow in size and to evaluate for potential for malignancy.     Problem/Recommendation - 7:  ·  Problem: Need for prophylactic measure.   ·  Recommendation: Hold chemical prophylaxis prior to surgery, SCD's okay. 53yo M w/ PMH of HTN, CAP, morbid obesity, chronic diastolic CHF presents for surgery scheduled for 2/1 with Dr. Capone for L4-5 Posterior spinal decompression. Medicine is being consulted for pre-surgical optimization.      Problem/Recommendation - 1:  ·  Problem: Lumbar radiculopathy, right.   ·  Recommendation: Pt presents with right sided lumbar radiculopathy and is planned for L4-5 posterior spinal decompression  - no cord compression or cauda equina symptoms or any other red flag signs  - f/up preop labs  - Hold chemical DVT prophylaxis  - Dr. Capone (surgery) following  - Dr. Alexandrea sanders (cardio), recs appreciated  - patient is an intermediate risk patient for an intermediate risk procedure and is medically optimized for the procedure  - at risk for undiagnosed INESSA and anesthesia should take necessary precautions     Problem/Recommendation - 2:  ·  Problem: Hypertension.   ·  Recommendation: c/w carvedilol, and spironolactone.  - hold lisinopril tomorrow unless hypertensive     Problem/Recommendation - 3:  ·  Problem: newly diagnosed Type 2 DM.   ·  Recommendation: Blood glucose 282  - pt states he does not have a history of diabetes   - continue to monitor  - Hgb A1c of 7.5%  - diabetes education/teaching - consult diabetes NP team  - FSG q6h while NPO and q AC&HS post-operatively with low-dose lispro ISS for now  -      Problem/Recommendation - 4:  ·  Problem: JUSTYNA  ·  Recommendation: Cr 1.5 on admission and improved to 0.98 with hydration  - likely was prerenal  - monitor BMP     Problem/Recommendation - 5:  ·  Problem: Microcytic anemia.   ·  Recommendation: Hgb 12.9, MCV 75.7  - f/u AM iron, TIBC, ferritin, B12, Folate  - f/u CBC  - patient has never had a colonoscopy he was instructed to establish care with a GI doctor as an outpatient and schedule a screening colonoscopy within 1-2 months given his age and microcytic anemia.     Problem/Recommendation - 6:  ·  Problem: Pulmonary nodule.   ·  Recommendation: - Chest X ray- 6 mm nodule of indeterminate etiology RIGHT lower zone periphery.  - No airspace consolidation.  - As no prior radiographs available for comparison follow-up chest radiographs in 6 months and one year to confirm stability or CT chest to confirm benignity.  -patient informed of findings and instructed to f/u with his PCP in 1 month to schedule repeat chest imaging, CT scan to ensure, stability of nodule. Discussed that importance of follow up to ensure nodule does not grow in size and to evaluate for potential for malignancy.     Problem/Recommendation - 7:  ·  Problem: Need for prophylactic measure.   ·  Recommendation: Hold chemical prophylaxis prior to surgery, SCD's okay.

## 2023-02-01 NOTE — PROGRESS NOTE ADULT - ASSESSMENT
52M with PMHx CAP, obesity , chronic diastolic CHF, HTN presenting   for surgery scheduled for tomorrow.      Cardiac clearance  - presented in ED, reports he was told by his surgeon to come to ED for surgery scheduled today, L4-L5 posterior spinal decompression, ortho following   - EKG:SR 76bpm with TWI lateral. No prior EKG for comparison   - Denies anginal complaints  - per patient he follows with a cardiologist (he cannot remember the name) in  [facility], called and attempted to get records from [facility] unable to secure OSH records     - TTE(2/1/2023)Low-normal left ventricular systolic function, estimated LVEF of 50-55%.   - No sign of volume overload, lying flat in bed, on room air  - CXR: no effusion or pneumothorax     - BP, HR stable and controlled  - continue home medications Coreg, Ace, Aldactone  - continue statin     - Pt has no active ischemia, decompensated heart failure, unstable arrythmia, or severe stenotic valvular disease. Patient is optimized from cardiovascular standpoint to proceed with planned procedure with routine hemodynamic monitoring.     - Monitor and replete Lytes. Keep K > 4 and Mg > 2  - Will continue to follow.    Clari Sesay [facility]  Nurse Practitioner - Cardiology   Spectra #[id number]

## 2023-02-01 NOTE — CARE COORDINATION ASSESSMENT. - NSCAREPROVIDERS_GEN_ALL_CORE_FT
CARE PROVIDERS:  Accepting Physician: Kashif Capone  Admitting: Kashif Capone  Attending: Kashif Capone  Cardiology Technician: Queenie Cruz  Case Management: Richy Silva  Consultant: Chidi Lopez  Consultant: Clari Sesay  Consultant: Chidi Edgar  Consultant: Zunilda Lopez  Consultant: Sherley Mcclellan  Consultant: Zelalem Stein  ED ACP: Pari Malik  ED Attending: Hali Flowers ED Nurse: Nate Monahan  Emergency Medicine: Jf Morton  Nurse: Zunilda Bardales  Nurse: Zakia Roth  Nurse: Micaela Jeffers  Ordered: Doctor, Unknown  Outpatient Provider: Kashif Capone  PCA/Nursing Assistant: Velma Pelletier  PCA/Nursing Assistant: Carolina Lugo  Physical Therapy: Jada Glaser  Primary Team: Ilya Farnsworth  Primary Team: Amico, Catherine  Primary Team: Selina Lechuga  Primary Team: Ramón Flowers  Primary Team: Larry Patel  Primary Team: Carson Michele  Primary Team: Wilian Koehler  Primary Team: Chidi Lake  Registered Dietitian: Gavi Harris  : Christine Helm  Support Svcs./Ancillary Svcs.: Sharon Whitney

## 2023-02-01 NOTE — CARE COORDINATION ASSESSMENT. - OTHER PERTINENT DISCHARGE PLANNING INFORMATION:
Met with patient at bedside to discuss the role of case management with verbalized understanding.  Needs unclear at present.  Patient pending OR for L4-L5 decompression.  Documents suggest patient for injured at work, but patient states he has not qualified for workmans comp.  WIll continue to follow from a case management perspective.

## 2023-02-01 NOTE — CARE COORDINATION ASSESSMENT. - NSPASTMEDSURGHISTORY_GEN_ALL_CORE_FT
PAST MEDICAL & SURGICAL HISTORY:  CAP (community acquired pneumonia)      Hypertension      No significant past surgical history

## 2023-02-02 DIAGNOSIS — E11.9 TYPE 2 DIABETES MELLITUS WITHOUT COMPLICATIONS: ICD-10-CM

## 2023-02-02 LAB
ANION GAP SERPL CALC-SCNC: 10 MMOL/L — SIGNIFICANT CHANGE UP (ref 5–17)
BASOPHILS # BLD AUTO: 0.02 K/UL — SIGNIFICANT CHANGE UP (ref 0–0.2)
BASOPHILS NFR BLD AUTO: 0.1 % — SIGNIFICANT CHANGE UP (ref 0–2)
BUN SERPL-MCNC: 15 MG/DL — SIGNIFICANT CHANGE UP (ref 7–23)
CALCIUM SERPL-MCNC: 8.9 MG/DL — SIGNIFICANT CHANGE UP (ref 8.5–10.1)
CHLORIDE SERPL-SCNC: 95 MMOL/L — LOW (ref 96–108)
CO2 SERPL-SCNC: 26 MMOL/L — SIGNIFICANT CHANGE UP (ref 22–31)
CREAT SERPL-MCNC: 1.3 MG/DL — SIGNIFICANT CHANGE UP (ref 0.5–1.3)
EGFR: 66 ML/MIN/1.73M2 — SIGNIFICANT CHANGE UP
EOSINOPHIL # BLD AUTO: 0 K/UL — SIGNIFICANT CHANGE UP (ref 0–0.5)
EOSINOPHIL NFR BLD AUTO: 0 % — SIGNIFICANT CHANGE UP (ref 0–6)
GLUCOSE SERPL-MCNC: 265 MG/DL — HIGH (ref 70–99)
HCT VFR BLD CALC: 42.7 % — SIGNIFICANT CHANGE UP (ref 39–50)
HGB BLD-MCNC: 13.3 G/DL — SIGNIFICANT CHANGE UP (ref 13–17)
IMM GRANULOCYTES NFR BLD AUTO: 0.5 % — SIGNIFICANT CHANGE UP (ref 0–0.9)
LYMPHOCYTES # BLD AUTO: 1.53 K/UL — SIGNIFICANT CHANGE UP (ref 1–3.3)
LYMPHOCYTES # BLD AUTO: 10.3 % — LOW (ref 13–44)
MCHC RBC-ENTMCNC: 23.7 PG — LOW (ref 27–34)
MCHC RBC-ENTMCNC: 31.1 GM/DL — LOW (ref 32–36)
MCV RBC AUTO: 76.1 FL — LOW (ref 80–100)
MONOCYTES # BLD AUTO: 0.68 K/UL — SIGNIFICANT CHANGE UP (ref 0–0.9)
MONOCYTES NFR BLD AUTO: 4.6 % — SIGNIFICANT CHANGE UP (ref 2–14)
NEUTROPHILS # BLD AUTO: 12.48 K/UL — HIGH (ref 1.8–7.4)
NEUTROPHILS NFR BLD AUTO: 84.5 % — HIGH (ref 43–77)
NRBC # BLD: 0 /100 WBCS — SIGNIFICANT CHANGE UP (ref 0–0)
PLATELET # BLD AUTO: 311 K/UL — SIGNIFICANT CHANGE UP (ref 150–400)
POTASSIUM SERPL-MCNC: 4.9 MMOL/L — SIGNIFICANT CHANGE UP (ref 3.5–5.3)
POTASSIUM SERPL-SCNC: 4.9 MMOL/L — SIGNIFICANT CHANGE UP (ref 3.5–5.3)
RBC # BLD: 5.61 M/UL — SIGNIFICANT CHANGE UP (ref 4.2–5.8)
RBC # FLD: 16.5 % — HIGH (ref 10.3–14.5)
SODIUM SERPL-SCNC: 131 MMOL/L — LOW (ref 135–145)
WBC # BLD: 14.79 K/UL — HIGH (ref 3.8–10.5)
WBC # FLD AUTO: 14.79 K/UL — HIGH (ref 3.8–10.5)

## 2023-02-02 PROCEDURE — 99232 SBSQ HOSP IP/OBS MODERATE 35: CPT

## 2023-02-02 PROCEDURE — 99233 SBSQ HOSP IP/OBS HIGH 50: CPT

## 2023-02-02 PROCEDURE — 99221 1ST HOSP IP/OBS SF/LOW 40: CPT

## 2023-02-02 RX ORDER — DEXTROSE 50 % IN WATER 50 %
15 SYRINGE (ML) INTRAVENOUS ONCE
Refills: 0 | Status: DISCONTINUED | OUTPATIENT
Start: 2023-02-02 | End: 2023-02-04

## 2023-02-02 RX ORDER — INSULIN LISPRO 100/ML
VIAL (ML) SUBCUTANEOUS
Refills: 0 | Status: DISCONTINUED | OUTPATIENT
Start: 2023-02-02 | End: 2023-02-04

## 2023-02-02 RX ORDER — OXYCODONE AND ACETAMINOPHEN 5; 325 MG/1; MG/1
1 TABLET ORAL
Qty: 28 | Refills: 0
Start: 2023-02-02 | End: 2023-02-08

## 2023-02-02 RX ORDER — GLUCAGON INJECTION, SOLUTION 0.5 MG/.1ML
1 INJECTION, SOLUTION SUBCUTANEOUS ONCE
Refills: 0 | Status: DISCONTINUED | OUTPATIENT
Start: 2023-02-02 | End: 2023-02-04

## 2023-02-02 RX ORDER — DOCUSATE SODIUM 100 MG
1 CAPSULE ORAL
Qty: 21 | Refills: 0
Start: 2023-02-02 | End: 2023-02-08

## 2023-02-02 RX ORDER — MELOXICAM 15 MG/1
1 TABLET ORAL
Qty: 7 | Refills: 0
Start: 2023-02-02 | End: 2023-02-08

## 2023-02-02 RX ORDER — VANCOMYCIN HCL 1 G
1750 VIAL (EA) INTRAVENOUS ONCE
Refills: 0 | Status: COMPLETED | OUTPATIENT
Start: 2023-02-02 | End: 2023-02-02

## 2023-02-02 RX ORDER — SODIUM CHLORIDE 9 MG/ML
1000 INJECTION, SOLUTION INTRAVENOUS
Refills: 0 | Status: DISCONTINUED | OUTPATIENT
Start: 2023-02-02 | End: 2023-02-04

## 2023-02-02 RX ORDER — DEXTROSE 50 % IN WATER 50 %
12.5 SYRINGE (ML) INTRAVENOUS ONCE
Refills: 0 | Status: DISCONTINUED | OUTPATIENT
Start: 2023-02-02 | End: 2023-02-04

## 2023-02-02 RX ORDER — ONDANSETRON 8 MG/1
1 TABLET, FILM COATED ORAL
Qty: 28 | Refills: 0
Start: 2023-02-02 | End: 2023-02-08

## 2023-02-02 RX ORDER — DEXTROSE 50 % IN WATER 50 %
25 SYRINGE (ML) INTRAVENOUS ONCE
Refills: 0 | Status: DISCONTINUED | OUTPATIENT
Start: 2023-02-02 | End: 2023-02-04

## 2023-02-02 RX ORDER — CYCLOBENZAPRINE HYDROCHLORIDE 10 MG/1
1 TABLET, FILM COATED ORAL
Qty: 21 | Refills: 0
Start: 2023-02-02 | End: 2023-02-08

## 2023-02-02 RX ORDER — HYDROMORPHONE HYDROCHLORIDE 2 MG/ML
0.5 INJECTION INTRAMUSCULAR; INTRAVENOUS; SUBCUTANEOUS ONCE
Refills: 0 | Status: DISCONTINUED | OUTPATIENT
Start: 2023-02-02 | End: 2023-02-02

## 2023-02-02 RX ORDER — INSULIN LISPRO 100/ML
VIAL (ML) SUBCUTANEOUS AT BEDTIME
Refills: 0 | Status: DISCONTINUED | OUTPATIENT
Start: 2023-02-02 | End: 2023-02-04

## 2023-02-02 RX ADMIN — Medication 250 MILLIGRAM(S): at 05:13

## 2023-02-02 RX ADMIN — Medication 650 MILLIGRAM(S): at 12:53

## 2023-02-02 RX ADMIN — HYDROMORPHONE HYDROCHLORIDE 0.5 MILLIGRAM(S): 2 INJECTION INTRAMUSCULAR; INTRAVENOUS; SUBCUTANEOUS at 02:22

## 2023-02-02 RX ADMIN — OXYCODONE HYDROCHLORIDE 10 MILLIGRAM(S): 5 TABLET ORAL at 08:48

## 2023-02-02 RX ADMIN — OXYCODONE HYDROCHLORIDE 10 MILLIGRAM(S): 5 TABLET ORAL at 22:43

## 2023-02-02 RX ADMIN — Medication 650 MILLIGRAM(S): at 06:43

## 2023-02-02 RX ADMIN — Medication 2: at 17:14

## 2023-02-02 RX ADMIN — Medication 650 MILLIGRAM(S): at 06:11

## 2023-02-02 RX ADMIN — PANTOPRAZOLE SODIUM 40 MILLIGRAM(S): 20 TABLET, DELAYED RELEASE ORAL at 06:10

## 2023-02-02 RX ADMIN — SENNA PLUS 2 TABLET(S): 8.6 TABLET ORAL at 21:41

## 2023-02-02 RX ADMIN — Medication 3: at 08:16

## 2023-02-02 RX ADMIN — Medication 650 MILLIGRAM(S): at 18:14

## 2023-02-02 RX ADMIN — Medication 650 MILLIGRAM(S): at 17:44

## 2023-02-02 RX ADMIN — OXYCODONE HYDROCHLORIDE 10 MILLIGRAM(S): 5 TABLET ORAL at 23:13

## 2023-02-02 RX ADMIN — OXYCODONE HYDROCHLORIDE 10 MILLIGRAM(S): 5 TABLET ORAL at 16:29

## 2023-02-02 RX ADMIN — OXYCODONE HYDROCHLORIDE 10 MILLIGRAM(S): 5 TABLET ORAL at 08:18

## 2023-02-02 RX ADMIN — HYDROMORPHONE HYDROCHLORIDE 0.5 MILLIGRAM(S): 2 INJECTION INTRAMUSCULAR; INTRAVENOUS; SUBCUTANEOUS at 01:52

## 2023-02-02 RX ADMIN — Medication 650 MILLIGRAM(S): at 12:23

## 2023-02-02 RX ADMIN — OXYCODONE HYDROCHLORIDE 10 MILLIGRAM(S): 5 TABLET ORAL at 16:44

## 2023-02-02 RX ADMIN — Medication 2: at 12:14

## 2023-02-02 RX ADMIN — CYCLOBENZAPRINE HYDROCHLORIDE 10 MILLIGRAM(S): 10 TABLET, FILM COATED ORAL at 12:24

## 2023-02-02 RX ADMIN — Medication 1 TABLET(S): at 12:23

## 2023-02-02 NOTE — CONSULT NOTE ADULT - SUBJECTIVE AND OBJECTIVE BOX
SW trigger/home with ongoing community support services.  SW reviewed the electronic record and collaborated with the RN.  Michelle is noted to be living independently in [place].  She was admitted to an observation bed on 3-30 due to atypical chest pain, most likely related to gastroesophageal reflux as noted in the H&P.  Michelle does not have an advance directive in the electronic record.  No SW intervention indicated at this time as Michelle is being discharged home and there are no discharge needs identified.        Patient is a 52y old  Male who presents with a chief complaint of Lumbar Radiculopathy (02 Feb 2023 08:05)    Type:2 new DX. No family hx. No PCP - does not have annual physicals. diabetes education provided- verbally and handouts.   known complications Endocrine Last seen Rx home Hx DKA/HHS, Glucometer checks, needs, weight, diet, exercise      HPI:  Patient is a 52yMale, who ambulates in the community with a cane, who presents to [facility] ED with progressive right lower extremity radicular pain, numbness, subjective weakness, and parethesias since a work-related accident in October 2022. He reports that the symptoms have become progressively severe despite conservative management and significantly interfere with his activities of daily living. He denies incontinence of bowel/bladder, denies saddle anesthesia, or any other complaints at this time.     PAST MEDICAL & SURGICAL HISTORY:  Hypertension  CAP (community acquired pneumonia)    No significant past surgical history    Vital Signs Last 24 Hrs  T(C): 36.7 (31 Jan 2023 13:31), Max: 36.7 (31 Jan 2023 13:31)  T(F): 98 (31 Jan 2023 13:31), Max: 98 (31 Jan 2023 13:31)  HR: 84 (31 Jan 2023 13:31) (84 - 84)  BP: 136/78 (31 Jan 2023 13:31) (136/78 - 136/78)  BP(mean): --  RR: 18 (31 Jan 2023 13:31) (18 - 18)  SpO2: 98% (31 Jan 2023 13:31) (98% - 98%)    Parameters below as of 31 Jan 2023 13:31  Patient On (Oxygen Delivery Method): room air      I&O's Detail      LABS:        PHYSICAL EXAM:  General; Awake and alert, Oriented x 3  Spine: No TTP over spinous processes or paraspinal muscles at C/T/L spine.   No palpable step off.   Able to actively SLR BL.   No pain to passive SLR   Ambulating w/o assistance             Motor exam:        C5       C6       C7       C8       T1   R  5/5      5/5     5/5     5/5      5/5  L   5/5      5/5     5/5     5/5      5/5         L2        L3       L4       L5      S1  R  5/5      5/5     5/5     5/5    4/5  L   5/5     5/5      5/5     5/5    5/5    Sensory:  (0=absent, 1=impaired, 2=normal, NT=not testable)      C5    C6   C7   C8   T1         R   2     2      2     2      2  L    2     2      2     2      2        L2    L3   L4   L5   S1   R   2     2     2     2     2  L    2     2     2     2     2    Right Upper extremity:   Negative Smith  +Rad Pulse  Compartments soft and compressible    Left Upper extremity:   Negative Smith  +Rad Pulse  Compartments soft and compressible    Right Lower Extremity:   Negative Clonus  Negative Babinski  +DP  Compartments soft and compressible           Left Lower Extremity:  Negative Clonus   Negative Babinski  +DP  Compartments soft and compressible                                          A/P :   Patient is a 52yMale with severe L4-L5 central canal stenosis    -Plan for OR tomorrow with Dr. Capone for L4-5 Posterior spinal decompression  - NPO after midnight   - FU preop labs   - Hold chemical DVT ppx, SCDs okay   - Please document medical optimization/clearance for OR  -All patient's questions answered. Patient understands and agrees w/ above plan.  -Will discuss w/ attending and advise if plan changes.  -Clinical presentation discussed w/ Dr. Capone who is aware and agrees w/ above plan. (31 Jan 2023 14:00)      PAST MEDICAL & SURGICAL HISTORY:  Hypertension      CAP (community acquired pneumonia)      No significant past surgical history          Allergies    morphine (Unknown)  pain medicine, name unknown (Unknown)  penicillins (Unknown)    Intolerances        MEDICATIONS  (STANDING):  acetaminophen     Tablet .. 650 milliGRAM(s) Oral every 6 hours  BUpivacaine liposome 1.3% Injectable 20 milliLiter(s) Local Injection once  dextrose 5%. 1000 milliLiter(s) (50 mL/Hr) IV Continuous <Continuous>  dextrose 5%. 1000 milliLiter(s) (100 mL/Hr) IV Continuous <Continuous>  dextrose 50% Injectable 25 Gram(s) IV Push once  dextrose 50% Injectable 12.5 Gram(s) IV Push once  dextrose 50% Injectable 25 Gram(s) IV Push once  glucagon  Injectable 1 milliGRAM(s) IntraMuscular once  influenza   Vaccine 0.5 milliLiter(s) IntraMuscular once  insulin lispro (ADMELOG) corrective regimen sliding scale   SubCutaneous three times a day before meals  insulin lispro (ADMELOG) corrective regimen sliding scale   SubCutaneous at bedtime  multivitamin 1 Tablet(s) Oral daily  pantoprazole    Tablet 40 milliGRAM(s) Oral before breakfast  senna 2 Tablet(s) Oral at bedtime

## 2023-02-02 NOTE — DISCHARGE NOTE PROVIDER - DETAILS OF MALNUTRITION DIAGNOSIS/DIAGNOSES
This patient has been assessed with a concern for Malnutrition and was treated during this hospitalization for the following Nutrition diagnosis/diagnoses:     -  02/04/2023: Morbid obesity (BMI > 40)

## 2023-02-02 NOTE — CONSULT NOTE ADULT - ASSESSMENT
Physical Exam:   Vital Signs Last 24 Hrs  T(C): 36.5 (02 Feb 2023 12:12), Max: 36.8 (01 Feb 2023 14:25)  T(F): 97.7 (02 Feb 2023 12:12), Max: 98.3 (01 Feb 2023 21:43)  HR: 72 (02 Feb 2023 12:12) (62 - 86)  BP: 134/75 (02 Feb 2023 12:12) (127/84 - 152/95)  BP(mean): --  RR: 18 (02 Feb 2023 12:12) (12 - 18)  SpO2: 94% (02 Feb 2023 12:12) (94% - 99%)    Parameters below as of 02 Feb 2023 12:12  Patient On (Oxygen Delivery Method): room air             CAPILLARY BLOOD GLUCOSE      POCT Blood Glucose.: 234 mg/dL (02 Feb 2023 11:36)  POCT Blood Glucose.: 251 mg/dL (02 Feb 2023 07:41)  POCT Blood Glucose.: 270 mg/dL (01 Feb 2023 22:35)  POCT Blood Glucose.: 261 mg/dL (01 Feb 2023 20:34)  POCT Blood Glucose.: 270 mg/dL (01 Feb 2023 19:39)      Cholesterol, Serum: 113 mg/dL (05.19.21 @ 08:36)     HDL Cholesterol, Serum: 22 mg/dL (05.19.21 @ 08:36)     LDL Cholesterol Calculated: 66 mg/dL (05.19.21 @ 08:36)     DIET: CC  >50%

## 2023-02-02 NOTE — PROGRESS NOTE ADULT - ASSESSMENT
51yo M w/ PMH of HTN, CAP, morbid obesity, chronic diastolic CHF presents for surgery scheduled for 2/1 with Dr. Capone for L4-5 Posterior spinal decompression. Medicine consulted for pre-surgical optimization, now s/p L4-5 posterior spinal decompression POD#1.     Problem/Recommendation - 1:  ·  Problem: Lumbar radiculopathy, right.   ·  Recommendation: Pt presents with right sided lumbar radiculopathy and is planned for L4-5 posterior spinal decompression  - no cord compression or cauda equina symptoms or any other red flag signs  - f/up preop labs  - Hold chemical DVT prophylaxis per ortho protocol  - Dr. Capone (ortho spine), recs appreciated  - blanca-op Abx with vancomycin, drain still in place  - Dr. Alexandrea sanders (cardio), recs appreciated  - patient is an intermediate risk patient for an intermediate risk procedure and is medically optimized for the procedure  - at risk for undiagnosed INESSA and anesthesia should take necessary precautions     Problem/Recommendation - 2:  ·  Problem: Hypertension.   ·  Recommendation: c/w carvedilol, and spironolactone.  - hold lisinopril until POD#2 unless hypertensive     Problem/Recommendation - 3:  ·  Problem: newly diagnosed Type 2 DM.   ·  Recommendation: Blood glucose in low-mid 200s  - pt states he does not have a history of diabetes, but did not seep physicians  - continue to monitor  - Hgb A1c of 7.5%  - diabetes education/teaching - consulted diabetes NP team, Weil, cliff appreciated  - FSG q AC&HS post-operatively with low-dose lispro ISS for now  - pt had been refusing ISS but today after explanation of meaning of A1c, pt accepting to try to ISS  - may need to start lantus as inpt, pending trend, likely to be on orals like metformin after discharge     Problem/Recommendation - 4:  ·  Problem: JUSTYNA  ·  Recommendation: Cr 1.5 on admission and improved to 0.98 with hydration  - likely was prerenal, currently 1.3, which might be ~pt's baseline  - monitor BMP     Problem/Recommendation - 5:  ·  Problem: Microcytic anemia.   ·  Recommendation: Hgb 12.9, MCV 75.7  - AM iron, TIBC, ferritin, B12, Folate technically wnl but given the low MCV and the TIBC of 362, pt likely has some iron deficiency -- rec starting ferrous sulfate  - f/u CBC  - patient has never had a colonoscopy he was instructed to establish care with a GI doctor as an outpatient and schedule a screening colonoscopy within 1-2 months given his age and microcytic anemia with likely iron deficiency     Problem/Recommendation - 6:  ·  Problem: Pulmonary nodule.   ·  Recommendation: - Chest X ray- 6 mm nodule of indeterminate etiology RIGHT lower zone periphery.  - No airspace consolidation.  - As no prior radiographs available for comparison follow-up chest radiographs in 6 months and one year to confirm stability or CT chest to confirm benignity.  - patient informed of findings and instructed to f/u with his PCP in 1 month to schedule repeat chest imaging, CT scan to ensure, stability of nodule. Discussed that importance of follow up to ensure nodule does not grow in size and to evaluate for potential for malignancy.     Problem/Recommendation - 7:  ·  Problem: Need for prophylactic measure.   ·  Recommendation: VTE ppx: Hold chemical prophylaxis per ortho spine, c/w SCDs

## 2023-02-02 NOTE — DISCHARGE NOTE PROVIDER - HOSPITAL COURSE
***INCOMPLETE NOTE, CHARTING IN PROGRESS***    The patient is a 52 year old male status post L4-5 posterior spinal fusion. The patient presented through [facility] ED, medicine and cardiology were consulted and deemed medically optimized for the previously mentioned surgical procedure. The patient was taken to the operating room on 2/1/23. Prophylactic antibiotics were started before the procedure and continued for 24 hours or until drain removed. There were no complications during the procedure and the patient tolerated the procedure well. The patient was transferred to the recovery room in stable condition and subsequently to the surgical floor. The patient was given SCDs for DVT prophylaxis. All home medications were continued. The patient received physical therapy daily and daily labs were followed. The dressing was kept clean, dry, intact. The drain was removed when output appropriately decreased. The rest of the hospital stay was unremarkable.* The patient was discharged in stable condition to follow up outpatient. The patient is a 52 year old male status post L4-5 posterior spinal fusion. The patient presented through [facility] ED, medicine and cardiology were consulted and deemed medically optimized for the previously mentioned surgical procedure. The patient was taken to the operating room on 2/1/23. Prophylactic antibiotics were started before the procedure and continued for 24 hours or until drain removed. There were no complications during the procedure and the patient tolerated the procedure well. The patient was transferred to the recovery room in stable condition and subsequently to the surgical floor. The patient was given SCDs for DVT prophylaxis. All home medications were continued. The patient received physical therapy daily and daily labs were followed. The dressing was kept clean, dry, intact. The drain was removed when output appropriately decreased. The rest of the hospital stay was unremarkable.* The patient was discharged in stable condition to follow up outpatient. The patient is a 52 year old male status post L4-5 posterior spinal decompression. The patient presented through [facility] ED, medicine and cardiology were consulted and deemed medically optimized for the previously mentioned surgical procedure. The patient was taken to the operating room on 2/1/23. Prophylactic antibiotics were started before the procedure and continued for 24 hours. There were no complications during the procedure and the patient tolerated the procedure well. The patient was transferred to the recovery room in stable condition and subsequently to the surgical floor. The patient was given SCDs for DVT prophylaxis. All home medications were continued. The patient received physical therapy daily and daily labs were followed. The dressing was kept clean, dry, intact. The drain was removed when output appropriately decreased. The patient was discharged in stable condition to follow up outpatient.

## 2023-02-02 NOTE — DISCHARGE NOTE PROVIDER - NSDCMRMEDTOKEN_GEN_ALL_CORE_FT
carvedilol 12.5 mg oral tablet: 1 tab(s) orally 2 times a day  Colace 100 mg oral capsule: 1 cap(s) orally 3 times a day, As Needed   cyclobenzaprine 5 mg oral tablet: 1 tab(s) orally every 8 hours, As Needed   lisinopril 20 mg oral tablet: 1 tab(s) orally once a day  loperamide 2 mg oral capsule: 1 cap(s) orally 3 times a day, As needed, Diarrhea  meloxicam 5 mg oral capsule: 1 cap(s) orally once a day MDD:1  ondansetron 4 mg oral tablet: 1 tab(s) orally every 6 hours, As Needed MDD:4  oxycodone-acetaminophen 5 mg-325 mg oral tablet: 1 tab(s) orally every 6 hours, As Needed MDD:4   rosuvastatin 5 mg oral tablet: 1 tab(s) orally once a day  spironolactone 25 mg oral tablet: 1 tab(s) orally once a day

## 2023-02-02 NOTE — PROGRESS NOTE ADULT - SUBJECTIVE AND OBJECTIVE BOX
[facility] -- Shruthi Cedillo,  Dara Lopez Patel, Savella, Goodger  Office # [phone]    Follow Up:    cardiac clearance     Subjective/Observations: Pt seen and examined, awake, alert, resting comfortably in bed, denies chest pain, dyspnea, palpitations or dizziness, orthopnea and PND. Tolerating room air.       REVIEW OF SYSTEMS: All other review of systems is negative unless indicated above  PAST MEDICAL & SURGICAL HISTORY:  Hypertension      CAP (community acquired pneumonia)      No significant past surgical history        MEDICATIONS  (STANDING):  acetaminophen     Tablet .. 650 milliGRAM(s) Oral every 6 hours  BUpivacaine liposome 1.3% Injectable 20 milliLiter(s) Local Injection once  dextrose 5%. 1000 milliLiter(s) (50 mL/Hr) IV Continuous <Continuous>  dextrose 5%. 1000 milliLiter(s) (100 mL/Hr) IV Continuous <Continuous>  dextrose 50% Injectable 25 Gram(s) IV Push once  dextrose 50% Injectable 12.5 Gram(s) IV Push once  dextrose 50% Injectable 25 Gram(s) IV Push once  glucagon  Injectable 1 milliGRAM(s) IntraMuscular once  influenza   Vaccine 0.5 milliLiter(s) IntraMuscular once  insulin lispro (ADMELOG) corrective regimen sliding scale   SubCutaneous three times a day before meals  insulin lispro (ADMELOG) corrective regimen sliding scale   SubCutaneous at bedtime  multivitamin 1 Tablet(s) Oral daily  pantoprazole    Tablet 40 milliGRAM(s) Oral before breakfast  senna 2 Tablet(s) Oral at bedtime    MEDICATIONS  (PRN):  cyclobenzaprine 10 milliGRAM(s) Oral every 8 hours PRN Muscle Spasm  dextrose Oral Gel 15 Gram(s) Oral once PRN Blood Glucose LESS THAN 70 milliGRAM(s)/deciliter  ondansetron   Disintegrating Tablet 4 milliGRAM(s) Oral every 6 hours PRN Nausea and/or Vomiting  oxyCODONE    IR 10 milliGRAM(s) Oral every 6 hours PRN Severe Pain (7 - 10)  oxyCODONE    IR 5 milliGRAM(s) Oral every 6 hours PRN Moderate Pain (4 - 6)  polyethylene glycol 3350 17 Gram(s) Oral daily PRN Constipation    Allergies    morphine (Unknown)  pain medicine, name unknown (Unknown)  penicillins (Unknown)    Intolerances      Vital Signs Last 24 Hrs  T(C): 36.7 (02 Feb 2023 05:00), Max: 36.8 (01 Feb 2023 14:25)  T(F): 98 (02 Feb 2023 05:00), Max: 98.3 (01 Feb 2023 21:43)  HR: 81 (02 Feb 2023 05:00) (62 - 86)  BP: 147/91 (02 Feb 2023 05:00) (127/84 - 152/95)  BP(mean): --  RR: 18 (02 Feb 2023 05:00) (12 - 18)  SpO2: 95% (02 Feb 2023 05:00) (94% - 99%)    Parameters below as of 02 Feb 2023 05:00  Patient On (Oxygen Delivery Method): nasal cannula      I&O's Summary    01 Feb 2023 07:01  -  02 Feb 2023 07:00  --------------------------------------------------------  IN: 1821 mL / OUT: 2155 mL / NET: -334 mL      Weight (kg): 127 (02-01 @ 14:41)    TELE: Not on telemetry   PHYSICAL EXAM:  Constitutional: NAD, awake and alert, obese  HEENT: Moist Mucous Membranes, Anicteric  Pulmonary: Non-labored, breath sounds are clear bilaterally, No wheezing, rales or rhonchi  Cardiovascular: Regular, S1 and S2, No murmurs, rubs, gallops or clicks  Gastrointestinal: Bowel Sounds present, soft, nontender.   Lymph: No peripheral edema. No lymphadenopathy.  Skin: No visible rashes or ulcers.  Psych:  Mood & affect appropriate    LABS: All Labs Reviewed:                        13.7   11.37 )-----------( 333      ( 01 Feb 2023 22:00 )             44.3                         12.7   7.45  )-----------( 285      ( 01 Feb 2023 05:25 )             40.9                         12.9   7.71  )-----------( 311      ( 31 Jan 2023 14:10 )             42.0     01 Feb 2023 22:00    131    |  96     |  16     ----------------------------<  271    4.9     |  27     |  1.30   01 Feb 2023 05:25    136    |  102    |  16     ----------------------------<  160    4.0     |  28     |  0.98   31 Jan 2023 14:10    137    |  103    |  20     ----------------------------<  282    4.6     |  27     |  1.50     Ca    9.1        01 Feb 2023 22:00  Ca    9.4        01 Feb 2023 05:25  Ca    9.7        31 Jan 2023 14:10      PT/INR - ( 01 Feb 2023 13:10 )   PT: 13.9 sec;   INR: 1.19 ratio         PTT - ( 01 Feb 2023 05:25 )  PTT:32.6 sec      12 Lead ECG:   Ventricular Rate 76 BPM    Atrial Rate 76 BPM    P-R Interval 164 ms    QRS Duration 100 ms    Q-T Interval 360 ms    QTC Calculation(Bazett) 405 ms    P Axis 38 degrees    R Axis -4 degrees    T Axis 68 degrees    Diagnosis Line Normal sinus rhythm  Minimal voltage criteria for LVH, may be normal variant ( Willy product )  T wave abnormality, consider lateral ischemia  Abnormal ECG  No previous ECGs available  Confirmed by Chidi Lopez MD ([id number]) on 1/31/2023 4:05:19 PM (01-31-23 @ 14:21)      ACC: [id number] EXAM:  ECHO TTE WO CON COMP W DOPP   ORDERED BY:  LILIA BHANDARI     PROCEDURE DATE:  02/01/2023          INTERPRETATION:  INDICATION: Preop  Sonographer    Blood Pressure unavailable    Height 175 cm     Weight 127 kg       BSA   2.4 sq m    Dimensions:  LA 3.9       Normal Values: 2.0 - 4.0 cm  Ao 3.7        Normal Values: 2.0 - 3.8 cm  SEPTUM 1.7       Normal Values: 0.6 - 1.2 cm  PWT 0.9       Normal Values: 0.6 - 1.1 cm  LVIDd 5.7         Normal Values: 3.0 - 5.6 cm  LVIDs 4.0       Normal Values: 1.8 - 4.0 cm      OBSERVATIONS:  Technically difficult study  Mitral Valve: normal, trace physiologic MR.  Aortic Valve/Aorta: normal trileaflet aortic valve.  Tricuspid Valve: normal with trace TR.  Pulmonic Valve: Not well-visualized  Left Atrium: normal  Right Atrium: Not well-visualized  Left Ventricle: Low-normal left ventricular systolic function, estimated   LVEF of 50-55%. Basal septal hypertrophy is noted. Cannot rule out   segmental abnormalities  Right Ventricle: Not well-visualized  Pericardium: no significant pericardial effusion.        IMPRESSION:  Technically difficult study  Low-normal left ventricular systolic function, estimated LVEF of 50-55%.   Basal septal hypertrophy is noted  Right ventricle is not well-visualized  Normal trileaflet aortic valve, without AI.  Trace physiologic MR and TR.  No significant pericardial effusion.    --- End of Report ---            ROBERTA LUKE MD; Attending Cardiologist  This document has been electronicallysigned. Feb 1 2023  9:59AM      [facility] -- Shruthi Cedillo,  Dara Lopez Patel, Savella, Goodger  Office # [phone]    Follow Up:    cardiac clearance     Subjective/Observations: Pt seen and examined, awake, alert, resting comfortably in bed. Pt denies chest pain, dyspnea, palpitations or dizziness, orthopnea and PND. Tolerating room air.       REVIEW OF SYSTEMS: All other review of systems is negative unless indicated above  PAST MEDICAL & SURGICAL HISTORY:  Hypertension      CAP (community acquired pneumonia)      No significant past surgical history        MEDICATIONS  (STANDING):  acetaminophen     Tablet .. 650 milliGRAM(s) Oral every 6 hours  BUpivacaine liposome 1.3% Injectable 20 milliLiter(s) Local Injection once  dextrose 5%. 1000 milliLiter(s) (50 mL/Hr) IV Continuous <Continuous>  dextrose 5%. 1000 milliLiter(s) (100 mL/Hr) IV Continuous <Continuous>  dextrose 50% Injectable 25 Gram(s) IV Push once  dextrose 50% Injectable 12.5 Gram(s) IV Push once  dextrose 50% Injectable 25 Gram(s) IV Push once  glucagon  Injectable 1 milliGRAM(s) IntraMuscular once  influenza   Vaccine 0.5 milliLiter(s) IntraMuscular once  insulin lispro (ADMELOG) corrective regimen sliding scale   SubCutaneous three times a day before meals  insulin lispro (ADMELOG) corrective regimen sliding scale   SubCutaneous at bedtime  multivitamin 1 Tablet(s) Oral daily  pantoprazole    Tablet 40 milliGRAM(s) Oral before breakfast  senna 2 Tablet(s) Oral at bedtime    MEDICATIONS  (PRN):  cyclobenzaprine 10 milliGRAM(s) Oral every 8 hours PRN Muscle Spasm  dextrose Oral Gel 15 Gram(s) Oral once PRN Blood Glucose LESS THAN 70 milliGRAM(s)/deciliter  ondansetron   Disintegrating Tablet 4 milliGRAM(s) Oral every 6 hours PRN Nausea and/or Vomiting  oxyCODONE    IR 10 milliGRAM(s) Oral every 6 hours PRN Severe Pain (7 - 10)  oxyCODONE    IR 5 milliGRAM(s) Oral every 6 hours PRN Moderate Pain (4 - 6)  polyethylene glycol 3350 17 Gram(s) Oral daily PRN Constipation    Allergies    morphine (Unknown)  pain medicine, name unknown (Unknown)  penicillins (Unknown)    Intolerances      Vital Signs Last 24 Hrs  T(C): 36.7 (02 Feb 2023 05:00), Max: 36.8 (01 Feb 2023 14:25)  T(F): 98 (02 Feb 2023 05:00), Max: 98.3 (01 Feb 2023 21:43)  HR: 81 (02 Feb 2023 05:00) (62 - 86)  BP: 147/91 (02 Feb 2023 05:00) (127/84 - 152/95)  BP(mean): --  RR: 18 (02 Feb 2023 05:00) (12 - 18)  SpO2: 95% (02 Feb 2023 05:00) (94% - 99%)    Parameters below as of 02 Feb 2023 05:00  Patient On (Oxygen Delivery Method): nasal cannula      I&O's Summary    01 Feb 2023 07:01  -  02 Feb 2023 07:00  --------------------------------------------------------  IN: 1821 mL / OUT: 2155 mL / NET: -334 mL      Weight (kg): 127 (02-01 @ 14:41)    TELE: Not on telemetry   PHYSICAL EXAM:  Constitutional: NAD, awake and alert, obese  HEENT: Moist Mucous Membranes, Anicteric  Pulmonary: Non-labored, breath sounds are clear bilaterally, No wheezing, rales or rhonchi  Cardiovascular: Regular, S1 and S2, No murmurs, rubs, gallops or clicks  Gastrointestinal: Bowel Sounds present, soft, nontender.   Lymph: No peripheral edema. No lymphadenopathy.   Skin: No visible rashes or ulcers.+ juan drain   Psych:  Mood & affect appropriate    LABS: All Labs Reviewed:                        13.7   11.37 )-----------( 333      ( 01 Feb 2023 22:00 )             44.3                         12.7   7.45  )-----------( 285      ( 01 Feb 2023 05:25 )             40.9                         12.9   7.71  )-----------( 311      ( 31 Jan 2023 14:10 )             42.0     01 Feb 2023 22:00    131    |  96     |  16     ----------------------------<  271    4.9     |  27     |  1.30   01 Feb 2023 05:25    136    |  102    |  16     ----------------------------<  160    4.0     |  28     |  0.98   31 Jan 2023 14:10    137    |  103    |  20     ----------------------------<  282    4.6     |  27     |  1.50     Ca    9.1        01 Feb 2023 22:00  Ca    9.4        01 Feb 2023 05:25  Ca    9.7        31 Jan 2023 14:10      PT/INR - ( 01 Feb 2023 13:10 )   PT: 13.9 sec;   INR: 1.19 ratio         PTT - ( 01 Feb 2023 05:25 )  PTT:32.6 sec      12 Lead ECG:   Ventricular Rate 76 BPM    Atrial Rate 76 BPM    P-R Interval 164 ms    QRS Duration 100 ms    Q-T Interval 360 ms    QTC Calculation(Bazett) 405 ms    P Axis 38 degrees    R Axis -4 degrees    T Axis 68 degrees    Diagnosis Line Normal sinus rhythm  Minimal voltage criteria for LVH, may be normal variant ( Willy product )  T wave abnormality, consider lateral ischemia  Abnormal ECG  No previous ECGs available  Confirmed by Chidi Lopez MD ([id number]) on 1/31/2023 4:05:19 PM (01-31-23 @ 14:21)      ACC: [id number] EXAM:  ECHO TTE WO CON COMP W DOPP   ORDERED BY:  LILIA BHANDARI     PROCEDURE DATE:  02/01/2023          INTERPRETATION:  INDICATION: Preop  Sonographer    Blood Pressure unavailable    Height 175 cm     Weight 127 kg       BSA   2.4 sq m    Dimensions:  LA 3.9       Normal Values: 2.0 - 4.0 cm  Ao 3.7        Normal Values: 2.0 - 3.8 cm  SEPTUM 1.7       Normal Values: 0.6 - 1.2 cm  PWT 0.9       Normal Values: 0.6 - 1.1 cm  LVIDd 5.7         Normal Values: 3.0 - 5.6 cm  LVIDs 4.0       Normal Values: 1.8 - 4.0 cm      OBSERVATIONS:  Technically difficult study  Mitral Valve: normal, trace physiologic MR.  Aortic Valve/Aorta: normal trileaflet aortic valve.  Tricuspid Valve: normal with trace TR.  Pulmonic Valve: Not well-visualized  Left Atrium: normal  Right Atrium: Not well-visualized  Left Ventricle: Low-normal left ventricular systolic function, estimated   LVEF of 50-55%. Basal septal hypertrophy is noted. Cannot rule out   segmental abnormalities  Right Ventricle: Not well-visualized  Pericardium: no significant pericardial effusion.        IMPRESSION:  Technically difficult study  Low-normal left ventricular systolic function, estimated LVEF of 50-55%.   Basal septal hypertrophy is noted  Right ventricle is not well-visualized  Normal trileaflet aortic valve, without AI.  Trace physiologic MR and TR.  No significant pericardial effusion.    --- End of Report ---            ROBERTA LUKE MD; Attending Cardiologist  This document has been electronicallysigned. Feb 1 2023  9:59AM

## 2023-02-02 NOTE — CONSULT NOTE ADULT - PROBLEM SELECTOR RECOMMENDATION 9
Type 2 A1c 7.5%   FU appt: KEHINDE  DSC recommendations: return to home with metformin regimen and glucose monitoring  diabetes education provided- verbally and handouts  Diabetes support info and cell # [phone] given   Goal 100-180 mg/dL; 140-180 mg/dL in critical care areas

## 2023-02-02 NOTE — OCCUPATIONAL THERAPY INITIAL EVALUATION ADULT - PERTINENT HX OF CURRENT PROBLEM, REHAB EVAL
53 y/o male who ambulates in the community with a cane, who presented to [facility] ED with progressive right lower extremity radicular pain, numbness, subjective weakness, and parasthesias since a work-related accident in October 2022. He reports that the symptoms have become progressively severe despite conservative management and significantly interfere with his activities of daily living. Patient s/p L4-5 decompression 2/1/23 due to lumbar stenosis.

## 2023-02-02 NOTE — CONSULT NOTE ADULT - CONSULT REASON
cardiac optimization
Lumbar radiculopathy
Medical Clearance
52y A1C with Estimated Average Glucose Result: 7.5 % (02-01-23 @ 05:25)   diabetes mellitus uncontrolled type 2

## 2023-02-02 NOTE — PHYSICAL THERAPY INITIAL EVALUATION ADULT - ADDITIONAL COMMENTS
Pt stated he uses occasional SC for ambulation, has 3 steps to enter, 10 steps to first floor, 7 to second floor with railing.

## 2023-02-02 NOTE — DISCHARGE NOTE PROVIDER - NSDCFUADDINST_GEN_ALL_CORE_FT
1. Walk plenty  2. No lifting over 5 lbs  3. Sponge bathe until cleared by your surgeon to shower.  4. Pain meds: eRx sent to your pharmacy electronically, you need to pick it up  5. No driving on pain meds  6. Follow up with your primary care doctor and or endocrinologist for diabetic workup and management outpatient   6. See your surgeon in 2-3 weeks in the office. Call to schedule. Orthopedic Spine Surgery Discharge Instructions    Ambulate as tolerated. Avoid heavy lifting, bending, or twisting until instructed to do otherwise.  Encourage ambulation as much as possible.  Take pain medication as prescribed in the discharge medication reconciliation.  Keep the surgical site dressing clean, dry, and intact for 7 days after removal of the surgical drain. You may shower starting 4 days after removal of the surgical drain, but do not soak the incision. Change dressing to dry dressing if becomes saturated.  Follow up in the office 3 weeks after surgery, or earlier if any concerns. Contact the office to schedule an appointment.  Remove nylon sutures on postoperative day 21 - 2/22/2023  If develop any fevers, chills, or purulent drainage from the incision, contact the office immediately or present to the emergency department.  Contact the office ([phone]) for any concerns.

## 2023-02-02 NOTE — OCCUPATIONAL THERAPY INITIAL EVALUATION ADULT - ADDITIONAL COMMENTS
Pt lives in a 2 level house with 3 steps to enter with handrail, 10+7 steps with handrail to bedroom. Pt has a bathtub. Pt reports that he will have assistance with ADLs upon d/c home from his significant other. Pt owns a cane. Pt performed sit to stand and ambulated bed to chair using RW with contact guard. Pt requires assistance with ADL's and transfers due to spine px, decreased flexibility, decreased strength, decreased endurance and impaired standing balance.

## 2023-02-02 NOTE — PROGRESS NOTE ADULT - SUBJECTIVE AND OBJECTIVE BOX
Patient is a 52y old  Male who presents with a chief complaint of intermittent numbness/tingling/weakness in right leg      INTERVAL HPI/OVERNIGHT EVENTS: Pt reports back pain post-op and has difficulty getting up due to the pain. Denies fever, chills, SOB, CP, abd pain, n/v.    MEDICATIONS  (STANDING):  acetaminophen     Tablet .. 650 milliGRAM(s) Oral every 6 hours  BUpivacaine liposome 1.3% Injectable 20 milliLiter(s) Local Injection once  dextrose 5%. 1000 milliLiter(s) (50 mL/Hr) IV Continuous <Continuous>  dextrose 5%. 1000 milliLiter(s) (100 mL/Hr) IV Continuous <Continuous>  dextrose 50% Injectable 25 Gram(s) IV Push once  dextrose 50% Injectable 12.5 Gram(s) IV Push once  dextrose 50% Injectable 25 Gram(s) IV Push once  glucagon  Injectable 1 milliGRAM(s) IntraMuscular once  influenza   Vaccine 0.5 milliLiter(s) IntraMuscular once  insulin lispro (ADMELOG) corrective regimen sliding scale   SubCutaneous three times a day before meals  insulin lispro (ADMELOG) corrective regimen sliding scale   SubCutaneous at bedtime  multivitamin 1 Tablet(s) Oral daily  pantoprazole    Tablet 40 milliGRAM(s) Oral before breakfast  senna 2 Tablet(s) Oral at bedtime    MEDICATIONS  (PRN):  cyclobenzaprine 10 milliGRAM(s) Oral every 8 hours PRN Muscle Spasm  dextrose Oral Gel 15 Gram(s) Oral once PRN Blood Glucose LESS THAN 70 milliGRAM(s)/deciliter  ondansetron   Disintegrating Tablet 4 milliGRAM(s) Oral every 6 hours PRN Nausea and/or Vomiting  oxyCODONE    IR 10 milliGRAM(s) Oral every 6 hours PRN Severe Pain (7 - 10)  oxyCODONE    IR 5 milliGRAM(s) Oral every 6 hours PRN Moderate Pain (4 - 6)  polyethylene glycol 3350 17 Gram(s) Oral daily PRN Constipation      Allergies    morphine (Unknown)  pain medicine, name unknown (Unknown)  penicillins (Unknown)    Intolerances        REVIEW OF SYSTEMS:  CONSTITUTIONAL: No fever or chills  HEENT:  No headache, no sore throat  RESPIRATORY: No cough, wheezing, or shortness of breath  CARDIOVASCULAR: No chest pain, palpitations  GASTROINTESTINAL: No abd pain, nausea, vomiting, or diarrhea  GENITOURINARY: No dysuria, frequency, or hematuria  NEUROLOGICAL: denies weakness or numbness ; no dizziness  MUSCULOSKELETAL: no myalgias ; ++back pain post-operatively    Vital Signs Last 24 Hrs  T(C): 36.7 (02 Feb 2023 05:00), Max: 36.8 (01 Feb 2023 14:25)  T(F): 98 (02 Feb 2023 05:00), Max: 98.3 (01 Feb 2023 21:43)  HR: 81 (02 Feb 2023 05:00) (62 - 86)  BP: 147/91 (02 Feb 2023 05:00) (127/84 - 152/95)  BP(mean): --  RR: 18 (02 Feb 2023 05:00) (12 - 18)  SpO2: 95% (02 Feb 2023 05:00) (94% - 99%)    Parameters below as of 02 Feb 2023 05:00  Patient On (Oxygen Delivery Method): nasal cannula        PHYSICAL EXAM:  GENERAL: NAD, morbidly obese  HEENT:  anicteric, moist mucous membranes  CHEST/LUNG:  CTA b/l, no rales, wheezes, or rhonchi  HEART:  RRR, S1, S2  ABDOMEN:  BS+, soft, nontender, nondistended  EXTREMITIES: no edema, cyanosis, or calf tenderness  BACK: orthopedic dressing in place, C/D/I; ESTELLE drain in place  NERVOUS SYSTEM: answers questions and follows commands appropriately ; sensation to touch intact b/l LEs; strength 5/5 in b/l LEs except for 4+/5 on right plantarflexion    LABS:                                   13.3   14.79 )-----------( 311      ( 02 Feb 2023 08:05 )             42.7     CBC Full  -  ( 02 Feb 2023 08:05 )  WBC Count : 14.79 K/uL  Hemoglobin : 13.3 g/dL  Hematocrit : 42.7 %  Platelet Count - Automated : 311 K/uL  Mean Cell Volume : 76.1 fl  Mean Cell Hemoglobin : 23.7 pg  Mean Cell Hemoglobin Concentration : 31.1 gm/dL  Auto Neutrophil # : 12.48 K/uL  Auto Lymphocyte # : 1.53 K/uL  Auto Monocyte # : 0.68 K/uL  Auto Eosinophil # : 0.00 K/uL  Auto Basophil # : 0.02 K/uL  Auto Neutrophil % : 84.5 %  Auto Lymphocyte % : 10.3 %  Auto Monocyte % : 4.6 %  Auto Eosinophil % : 0.0 %  Auto Basophil % : 0.1 %    02-02    131<L>  |  95<L>  |  15  ----------------------------<  265<H>  4.9   |  26  |  1.30    Ca    8.9      02 Feb 2023 08:05      CAPILLARY BLOOD GLUCOSE      POCT Blood Glucose.: 234 mg/dL (02 Feb 2023 11:36)  POCT Blood Glucose.: 251 mg/dL (02 Feb 2023 07:41)  POCT Blood Glucose.: 270 mg/dL (01 Feb 2023 22:35)  POCT Blood Glucose.: 261 mg/dL (01 Feb 2023 20:34)  POCT Blood Glucose.: 270 mg/dL (01 Feb 2023 19:39)        RADIOLOGY & ADDITIONAL TESTS:    Personally reviewed.     Consultant(s) Notes Reviewed:  [x] YES  [ ] NO     Patient is a 52y old  Male who presents with a chief complaint of intermittent numbness/tingling/weakness in right leg       INTERVAL HPI/OVERNIGHT EVENTS: Pt reports back pain post-op and has difficulty getting up due to the pain. Denies fever, chills, SOB, CP, abd pain, n/v.    MEDICATIONS  (STANDING):  acetaminophen     Tablet .. 650 milliGRAM(s) Oral every 6 hours  BUpivacaine liposome 1.3% Injectable 20 milliLiter(s) Local Injection once  dextrose 5%. 1000 milliLiter(s) (50 mL/Hr) IV Continuous <Continuous>  dextrose 5%. 1000 milliLiter(s) (100 mL/Hr) IV Continuous <Continuous>  dextrose 50% Injectable 25 Gram(s) IV Push once  dextrose 50% Injectable 12.5 Gram(s) IV Push once  dextrose 50% Injectable 25 Gram(s) IV Push once  glucagon  Injectable 1 milliGRAM(s) IntraMuscular once  influenza   Vaccine 0.5 milliLiter(s) IntraMuscular once  insulin lispro (ADMELOG) corrective regimen sliding scale   SubCutaneous three times a day before meals  insulin lispro (ADMELOG) corrective regimen sliding scale   SubCutaneous at bedtime  multivitamin 1 Tablet(s) Oral daily  pantoprazole    Tablet 40 milliGRAM(s) Oral before breakfast  senna 2 Tablet(s) Oral at bedtime    MEDICATIONS  (PRN):  cyclobenzaprine 10 milliGRAM(s) Oral every 8 hours PRN Muscle Spasm  dextrose Oral Gel 15 Gram(s) Oral once PRN Blood Glucose LESS THAN 70 milliGRAM(s)/deciliter  ondansetron   Disintegrating Tablet 4 milliGRAM(s) Oral every 6 hours PRN Nausea and/or Vomiting  oxyCODONE    IR 10 milliGRAM(s) Oral every 6 hours PRN Severe Pain (7 - 10)  oxyCODONE    IR 5 milliGRAM(s) Oral every 6 hours PRN Moderate Pain (4 - 6)  polyethylene glycol 3350 17 Gram(s) Oral daily PRN Constipation      Allergies    morphine (Unknown)  pain medicine, name unknown (Unknown)  penicillins (Unknown)    Intolerances        REVIEW OF SYSTEMS:  CONSTITUTIONAL: No fever or chills  HEENT:  No headache, no sore throat  RESPIRATORY: No cough, wheezing, or shortness of breath  CARDIOVASCULAR: No chest pain, palpitations  GASTROINTESTINAL: No abd pain, nausea, vomiting, or diarrhea  GENITOURINARY: No dysuria, frequency, or hematuria  NEUROLOGICAL: denies weakness or numbness ; no dizziness  MUSCULOSKELETAL: no myalgias ; ++back pain post-operatively    Vital Signs Last 24 Hrs  T(C): 36.7 (02 Feb 2023 05:00), Max: 36.8 (01 Feb 2023 14:25)  T(F): 98 (02 Feb 2023 05:00), Max: 98.3 (01 Feb 2023 21:43)  HR: 81 (02 Feb 2023 05:00) (62 - 86)  BP: 147/91 (02 Feb 2023 05:00) (127/84 - 152/95)  BP(mean): --  RR: 18 (02 Feb 2023 05:00) (12 - 18)  SpO2: 95% (02 Feb 2023 05:00) (94% - 99%)    Parameters below as of 02 Feb 2023 05:00  Patient On (Oxygen Delivery Method): nasal cannula        PHYSICAL EXAM:  GENERAL: NAD, morbidly obese  HEENT:  anicteric, moist mucous membranes  CHEST/LUNG:  CTA b/l, no rales, wheezes, or rhonchi  HEART:  RRR, S1, S2  ABDOMEN:  BS+, soft, nontender, nondistended  EXTREMITIES: no edema, cyanosis, or calf tenderness  BACK: orthopedic dressing in place, C/D/I; ESTELLE drain in place  NERVOUS SYSTEM: answers questions and follows commands appropriately ; sensation to touch intact b/l LEs; strength 5/5 in b/l LEs except for 4+/5 on right plantarflexion    LABS:                                   13.3   14.79 )-----------( 311      ( 02 Feb 2023 08:05 )             42.7     CBC Full  -  ( 02 Feb 2023 08:05 )  WBC Count : 14.79 K/uL  Hemoglobin : 13.3 g/dL  Hematocrit : 42.7 %  Platelet Count - Automated : 311 K/uL  Mean Cell Volume : 76.1 fl  Mean Cell Hemoglobin : 23.7 pg  Mean Cell Hemoglobin Concentration : 31.1 gm/dL  Auto Neutrophil # : 12.48 K/uL  Auto Lymphocyte # : 1.53 K/uL  Auto Monocyte # : 0.68 K/uL  Auto Eosinophil # : 0.00 K/uL  Auto Basophil # : 0.02 K/uL  Auto Neutrophil % : 84.5 %  Auto Lymphocyte % : 10.3 %  Auto Monocyte % : 4.6 %  Auto Eosinophil % : 0.0 %  Auto Basophil % : 0.1 %    02-02    131<L>  |  95<L>  |  15  ----------------------------<  265<H>  4.9   |  26  |  1.30    Ca    8.9      02 Feb 2023 08:05      CAPILLARY BLOOD GLUCOSE      POCT Blood Glucose.: 234 mg/dL (02 Feb 2023 11:36)  POCT Blood Glucose.: 251 mg/dL (02 Feb 2023 07:41)  POCT Blood Glucose.: 270 mg/dL (01 Feb 2023 22:35)  POCT Blood Glucose.: 261 mg/dL (01 Feb 2023 20:34)  POCT Blood Glucose.: 270 mg/dL (01 Feb 2023 19:39)        RADIOLOGY & ADDITIONAL TESTS:    Personally reviewed.     Consultant(s) Notes Reviewed:  [x] YES  [ ] NO     Patient is a 52y old  Male who presents with a chief complaint of intermittent numbness/tingling/weakness in right leg       INTERVAL HPI/OVERNIGHT EVENTS: Pt reports back pain post-op and has difficulty getting up due to the pain. Denies fever, chills, SOB, CP, abd pain, n/v.    MEDICATIONS  (STANDING):  acetaminophen     Tablet .. 650 milliGRAM(s) Oral every 6 hours  BUpivacaine liposome 1.3% Injectable 20 milliLiter(s) Local Injection once  dextrose 5%. 1000 milliLiter(s) (50 mL/Hr) IV Continuous <Continuous>  dextrose 5%. 1000 milliLiter(s) (100 mL/Hr) IV Continuous <Continuous>  dextrose 50% Injectable 25 Gram(s) IV Push once  dextrose 50% Injectable 12.5 Gram(s) IV Push once  dextrose 50% Injectable 25 Gram(s) IV Push once  glucagon  Injectable 1 milliGRAM(s) IntraMuscular once  influenza   Vaccine 0.5 milliLiter(s) IntraMuscular once  insulin lispro (ADMELOG) corrective regimen sliding scale   SubCutaneous three times a day before meals  insulin lispro (ADMELOG) corrective regimen sliding scale   SubCutaneous at bedtime  multivitamin 1 Tablet(s) Oral daily  pantoprazole    Tablet 40 milliGRAM(s) Oral before breakfast  senna 2 Tablet(s) Oral at bedtime    MEDICATIONS  (PRN):  cyclobenzaprine 10 milliGRAM(s) Oral every 8 hours PRN Muscle Spasm  dextrose Oral Gel 15 Gram(s) Oral once PRN Blood Glucose LESS THAN 70 milliGRAM(s)/deciliter  ondansetron   Disintegrating Tablet 4 milliGRAM(s) Oral every 6 hours PRN Nausea and/or Vomiting  oxyCODONE    IR 10 milliGRAM(s) Oral every 6 hours PRN Severe Pain (7 - 10)  oxyCODONE    IR 5 milliGRAM(s) Oral every 6 hours PRN Moderate Pain (4 - 6)  polyethylene glycol 3350 17 Gram(s) Oral daily PRN Constipation      Allergies    morphine (Unknown)  pain medicine, name unknown (Unknown)  penicillins (Unknown)    Intolerances        REVIEW OF SYSTEMS:  CONSTITUTIONAL: No fever or chills  HEENT:  No headache, no sore throat  RESPIRATORY: No cough, wheezing, or shortness of breath  CARDIOVASCULAR: No chest pain, palpitations  GASTROINTESTINAL: No abd pain, nausea, vomiting, or diarrhea  GENITOURINARY: No dysuria, frequency, or hematuria  NEUROLOGICAL: denies weakness or numbness ; no dizziness  MUSCULOSKELETAL: no myalgias ; ++back pain post-operatively    Vital Signs Last 24 Hrs  T(C): 36.7 (02 Feb 2023 05:00), Max: 36.8 (01 Feb 2023 14:25)  T(F): 98 (02 Feb 2023 05:00), Max: 98.3 (01 Feb 2023 21:43)  HR: 81 (02 Feb 2023 05:00) (62 - 86)  BP: 147/91 (02 Feb 2023 05:00) (127/84 - 152/95)  BP(mean): --  RR: 18 (02 Feb 2023 05:00) (12 - 18)  SpO2: 95% (02 Feb 2023 05:00) (94% - 99%)    Parameters below as of 02 Feb 2023 05:00  Patient On (Oxygen Delivery Method): nasal cannula        PHYSICAL EXAM:  GENERAL: NAD, morbidly obese  HEENT:  anicteric, moist mucous membranes  CHEST/LUNG:  CTA b/l, no rales, wheezes, or rhonchi  HEART:  RRR, S1, S2  ABDOMEN:  BS+, soft, nontender, nondistended  EXTREMITIES: no edema, cyanosis, or calf tenderness  BACK: orthopedic dressing in place, C/D/I; ESTELLE drain in place  NERVOUS SYSTEM: answers questions and follows commands appropriately ; sensation to touch intact b/l LEs; strength 5/5 b/l     LABS:                                   13.3   14.79 )-----------( 311      ( 02 Feb 2023 08:05 )             42.7     CBC Full  -  ( 02 Feb 2023 08:05 )  WBC Count : 14.79 K/uL  Hemoglobin : 13.3 g/dL  Hematocrit : 42.7 %  Platelet Count - Automated : 311 K/uL  Mean Cell Volume : 76.1 fl  Mean Cell Hemoglobin : 23.7 pg  Mean Cell Hemoglobin Concentration : 31.1 gm/dL  Auto Neutrophil # : 12.48 K/uL  Auto Lymphocyte # : 1.53 K/uL  Auto Monocyte # : 0.68 K/uL  Auto Eosinophil # : 0.00 K/uL  Auto Basophil # : 0.02 K/uL  Auto Neutrophil % : 84.5 %  Auto Lymphocyte % : 10.3 %  Auto Monocyte % : 4.6 %  Auto Eosinophil % : 0.0 %  Auto Basophil % : 0.1 %    02-02    131<L>  |  95<L>  |  15  ----------------------------<  265<H>  4.9   |  26  |  1.30    Ca    8.9      02 Feb 2023 08:05      CAPILLARY BLOOD GLUCOSE      POCT Blood Glucose.: 234 mg/dL (02 Feb 2023 11:36)  POCT Blood Glucose.: 251 mg/dL (02 Feb 2023 07:41)  POCT Blood Glucose.: 270 mg/dL (01 Feb 2023 22:35)  POCT Blood Glucose.: 261 mg/dL (01 Feb 2023 20:34)  POCT Blood Glucose.: 270 mg/dL (01 Feb 2023 19:39)        RADIOLOGY & ADDITIONAL TESTS:    Personally reviewed.     Consultant(s) Notes Reviewed:  [x] YES  [ ] NO

## 2023-02-02 NOTE — CASE MANAGEMENT PROGRESS NOTE - NSCMPROGRESSNOTE_GEN_ALL_CORE
Met with patient at bedside to discuss transition planning.  Patient is POD 1 L4-5 decompression and was seen by physical therapy this AM.  Patient is recommended for RW with no further PT/OT needs.  Patient is agreeable to pursue RW.  Referral sent to [facility] and will follow any further needs.

## 2023-02-02 NOTE — DISCHARGE NOTE PROVIDER - CARE PROVIDER_API CALL
Kashif Capone (DO)  Orthopaedic Surgery Surgery  [address] [place] [address]  Phone: [phone]  Fax: [phone]  Follow Up Time:    Kashif Capone (DO)  Orthopaedic Surgery Surgery  [address] [place] [address]  Phone: [phone]  Fax: [phone]  Follow Up Time: 1 month

## 2023-02-02 NOTE — DISCHARGE NOTE PROVIDER - NSDCCPTREATMENT_GEN_ALL_CORE_FT
PRINCIPAL PROCEDURE  Procedure: Lumbar laminectomy with discectomy  Findings and Treatment: L4-5

## 2023-02-02 NOTE — DISCHARGE NOTE NURSING/CASE MANAGEMENT/SOCIAL WORK - PATIENT PORTAL LINK FT
You can access the FollowMyHealth Patient Portal offered by [facility] by registering at the following website: [email or web address]. By joining [facility] [employer]’s FollowMyHealth portal, you will also be able to view your health information using other applications (apps) compatible with our system.

## 2023-02-02 NOTE — PROGRESS NOTE ADULT - SUBJECTIVE AND OBJECTIVE BOX
Postop Check    Patient tolerated the procedure well. Patient seen and examined at bedside. Reports pain is improved after surgery. No acute complaints at this time. Pain well controlled. Denies new weakness, numbness or tingling. Denies chest pain, shortness of breath, nausea or vomiting.     PE:    General: NAD, resting comfortably in bed  Dressing C/D/I  ESTELLE in place, SS drainage    Motor:                   L2                  L3             L4              L5            S1  R            5/5                5/5             5/5            5/5          5/5  L             5/5                5/5            5/5            5/5          5/5    Sensory:             L2          L3         L4      L5       S1         (0=absent, 1=impaired, 2=normal, NT=not testable)  R         2            2            2        2        2  L          2            2           2        2         2    LABS:                        13.7   11.37 )-----------( 333      ( 01 Feb 2023 22:00 )             44.3     02-01    131<L>  |  96  |  16  ----------------------------<  271<H>  4.9   |  27  |  1.30    Ca    9.1      01 Feb 2023 22:00      PT/INR - ( 01 Feb 2023 13:10 )   PT: 13.9 sec;   INR: 1.19 ratio         PTT - ( 01 Feb 2023 05:25 )  PTT:32.6 sec      VITAL SIGNS:  T(C): 36.7 (02-02-23 @ 05:00), Max: 36.8 (02-01-23 @ 14:25)  HR: 81 (02-02-23 @ 05:00) (62 - 86)  BP: 147/91 (02-02-23 @ 05:00) (127/84 - 152/95)  RR: 18 (02-02-23 @ 05:00) (12 - 18)  SpO2: 95% (02-02-23 @ 05:00) (94% - 99%)      A/P:  52y M s/p L4-5 decompression POD 1  -PT/OT   -WBAT  -ESTELLE 50/55  -please record drain output qSHIFT  -Pain Control  -DVT ppx with SCDs  -Continue perioperative abx x 24 hours  -FU AM Labs  -Incentive Spirometry  -Medical comanagement appreciated  -dispo pending PT eval Subjective  Patient complains of significant surgical site pain.  The patient denies that he feels any of his preoperative radicular symptoms.  He denies any other complaints this time.    PE:    General: NAD, resting comfortably in bed  Dressing C/D/I  Drain in place    Motor:                   L2                  L3             L4              L5            S1  R            5/5                5/5             5/5            5/5          5/5  L             5/5                5/5            5/5            5/5          5/5    Sensory:             L2          L3         L4      L5       S1         (0=absent, 1=impaired, 2=normal, NT=not testable)  R         2            2            2        2        2  L          2            2           2        2         2    LABS:                        13.7   11.37 )-----------( 333      ( 01 Feb 2023 22:00 )             44.3     02-01    131<L>  |  96  |  16  ----------------------------<  271<H>  4.9   |  27  |  1.30    Ca    9.1      01 Feb 2023 22:00      PT/INR - ( 01 Feb 2023 13:10 )   PT: 13.9 sec;   INR: 1.19 ratio         PTT - ( 01 Feb 2023 05:25 )  PTT:32.6 sec      VITAL SIGNS:  T(C): 36.7 (02-02-23 @ 05:00), Max: 36.8 (02-01-23 @ 14:25)  HR: 81 (02-02-23 @ 05:00) (62 - 86)  BP: 147/91 (02-02-23 @ 05:00) (127/84 - 152/95)  RR: 18 (02-02-23 @ 05:00) (12 - 18)  SpO2: 95% (02-02-23 @ 05:00) (94% - 99%)      A/P:  52y M s/p L4-5 decompression on 2/1/2023    -PT/OT   -WBAT  -please record drain output qSHIFT  -Pain Control  -DVT ppx with SCDs  -Continue perioperative abx x 24 hours  -FU AM Labs  -Incentive Spirometry  -Medical comanagement appreciated  -dispo pending PT eval

## 2023-02-03 LAB
ANION GAP SERPL CALC-SCNC: 6 MMOL/L — SIGNIFICANT CHANGE UP (ref 5–17)
BASOPHILS # BLD AUTO: 0.03 K/UL — SIGNIFICANT CHANGE UP (ref 0–0.2)
BASOPHILS NFR BLD AUTO: 0.2 % — SIGNIFICANT CHANGE UP (ref 0–2)
BUN SERPL-MCNC: 19 MG/DL — SIGNIFICANT CHANGE UP (ref 7–23)
CALCIUM SERPL-MCNC: 9.1 MG/DL — SIGNIFICANT CHANGE UP (ref 8.5–10.1)
CHLORIDE SERPL-SCNC: 99 MMOL/L — SIGNIFICANT CHANGE UP (ref 96–108)
CO2 SERPL-SCNC: 30 MMOL/L — SIGNIFICANT CHANGE UP (ref 22–31)
CREAT SERPL-MCNC: 1.2 MG/DL — SIGNIFICANT CHANGE UP (ref 0.5–1.3)
EGFR: 73 ML/MIN/1.73M2 — SIGNIFICANT CHANGE UP
EOSINOPHIL # BLD AUTO: 0.03 K/UL — SIGNIFICANT CHANGE UP (ref 0–0.5)
EOSINOPHIL NFR BLD AUTO: 0.2 % — SIGNIFICANT CHANGE UP (ref 0–6)
GLUCOSE SERPL-MCNC: 172 MG/DL — HIGH (ref 70–99)
HCT VFR BLD CALC: 42.3 % — SIGNIFICANT CHANGE UP (ref 39–50)
HGB BLD-MCNC: 13.1 G/DL — SIGNIFICANT CHANGE UP (ref 13–17)
IMM GRANULOCYTES NFR BLD AUTO: 0.4 % — SIGNIFICANT CHANGE UP (ref 0–0.9)
LYMPHOCYTES # BLD AUTO: 23.5 % — SIGNIFICANT CHANGE UP (ref 13–44)
LYMPHOCYTES # BLD AUTO: 3.13 K/UL — SIGNIFICANT CHANGE UP (ref 1–3.3)
MCHC RBC-ENTMCNC: 23.6 PG — LOW (ref 27–34)
MCHC RBC-ENTMCNC: 31 GM/DL — LOW (ref 32–36)
MCV RBC AUTO: 76.4 FL — LOW (ref 80–100)
MONOCYTES # BLD AUTO: 1.21 K/UL — HIGH (ref 0–0.9)
MONOCYTES NFR BLD AUTO: 9.1 % — SIGNIFICANT CHANGE UP (ref 2–14)
NEUTROPHILS # BLD AUTO: 8.87 K/UL — HIGH (ref 1.8–7.4)
NEUTROPHILS NFR BLD AUTO: 66.6 % — SIGNIFICANT CHANGE UP (ref 43–77)
NRBC # BLD: 0 /100 WBCS — SIGNIFICANT CHANGE UP (ref 0–0)
PLATELET # BLD AUTO: 275 K/UL — SIGNIFICANT CHANGE UP (ref 150–400)
POTASSIUM SERPL-MCNC: 4.1 MMOL/L — SIGNIFICANT CHANGE UP (ref 3.5–5.3)
POTASSIUM SERPL-SCNC: 4.1 MMOL/L — SIGNIFICANT CHANGE UP (ref 3.5–5.3)
RBC # BLD: 5.54 M/UL — SIGNIFICANT CHANGE UP (ref 4.2–5.8)
RBC # FLD: 16.6 % — HIGH (ref 10.3–14.5)
SODIUM SERPL-SCNC: 135 MMOL/L — SIGNIFICANT CHANGE UP (ref 135–145)
WBC # BLD: 13.32 K/UL — HIGH (ref 3.8–10.5)
WBC # FLD AUTO: 13.32 K/UL — HIGH (ref 3.8–10.5)

## 2023-02-03 PROCEDURE — 99232 SBSQ HOSP IP/OBS MODERATE 35: CPT

## 2023-02-03 RX ORDER — VANCOMYCIN HCL 1 G
1750 VIAL (EA) INTRAVENOUS ONCE
Refills: 0 | Status: COMPLETED | OUTPATIENT
Start: 2023-02-03 | End: 2023-02-03

## 2023-02-03 RX ADMIN — Medication 1: at 17:14

## 2023-02-03 RX ADMIN — OXYCODONE HYDROCHLORIDE 10 MILLIGRAM(S): 5 TABLET ORAL at 20:21

## 2023-02-03 RX ADMIN — CYCLOBENZAPRINE HYDROCHLORIDE 10 MILLIGRAM(S): 10 TABLET, FILM COATED ORAL at 12:03

## 2023-02-03 RX ADMIN — Medication 250 MILLIGRAM(S): at 17:14

## 2023-02-03 RX ADMIN — Medication 1: at 08:00

## 2023-02-03 RX ADMIN — OXYCODONE HYDROCHLORIDE 10 MILLIGRAM(S): 5 TABLET ORAL at 06:57

## 2023-02-03 RX ADMIN — Medication 650 MILLIGRAM(S): at 05:47

## 2023-02-03 RX ADMIN — OXYCODONE HYDROCHLORIDE 10 MILLIGRAM(S): 5 TABLET ORAL at 19:51

## 2023-02-03 RX ADMIN — SENNA PLUS 2 TABLET(S): 8.6 TABLET ORAL at 22:26

## 2023-02-03 RX ADMIN — Medication 1 TABLET(S): at 11:38

## 2023-02-03 RX ADMIN — Medication 650 MILLIGRAM(S): at 05:17

## 2023-02-03 RX ADMIN — Medication 650 MILLIGRAM(S): at 01:02

## 2023-02-03 RX ADMIN — OXYCODONE HYDROCHLORIDE 10 MILLIGRAM(S): 5 TABLET ORAL at 06:27

## 2023-02-03 RX ADMIN — PANTOPRAZOLE SODIUM 40 MILLIGRAM(S): 20 TABLET, DELAYED RELEASE ORAL at 05:17

## 2023-02-03 RX ADMIN — Medication 650 MILLIGRAM(S): at 11:38

## 2023-02-03 RX ADMIN — OXYCODONE HYDROCHLORIDE 10 MILLIGRAM(S): 5 TABLET ORAL at 12:03

## 2023-02-03 RX ADMIN — Medication 650 MILLIGRAM(S): at 00:32

## 2023-02-03 RX ADMIN — Medication 1: at 11:40

## 2023-02-03 RX ADMIN — Medication 650 MILLIGRAM(S): at 17:14

## 2023-02-03 NOTE — PROGRESS NOTE ADULT - SUBJECTIVE AND OBJECTIVE BOX
[facility] -- Shruthi Cedillo,  Jessica, Edwin Diamond Savella, [place]  Office # [phone]    Follow Up:  Follow Up:    cardiac clearance     Subjective/Observations: Pt seen and examined, awake, alert, resting comfortably in bed. Pt denies chest pain, dyspnea, palpitations or dizziness, orthopnea and PND. Tolerating room air.         REVIEW OF SYSTEMS: All other review of systems is negative unless indicated above  PAST MEDICAL & SURGICAL HISTORY:  Hypertension      CAP (community acquired pneumonia)      No significant past surgical history        MEDICATIONS  (STANDING):  acetaminophen     Tablet .. 650 milliGRAM(s) Oral every 6 hours  BUpivacaine liposome 1.3% Injectable 20 milliLiter(s) Local Injection once  dextrose 5%. 1000 milliLiter(s) (50 mL/Hr) IV Continuous <Continuous>  dextrose 5%. 1000 milliLiter(s) (100 mL/Hr) IV Continuous <Continuous>  dextrose 50% Injectable 25 Gram(s) IV Push once  dextrose 50% Injectable 12.5 Gram(s) IV Push once  dextrose 50% Injectable 25 Gram(s) IV Push once  glucagon  Injectable 1 milliGRAM(s) IntraMuscular once  influenza   Vaccine 0.5 milliLiter(s) IntraMuscular once  insulin lispro (ADMELOG) corrective regimen sliding scale   SubCutaneous three times a day before meals  insulin lispro (ADMELOG) corrective regimen sliding scale   SubCutaneous at bedtime  multivitamin 1 Tablet(s) Oral daily  pantoprazole    Tablet 40 milliGRAM(s) Oral before breakfast  senna 2 Tablet(s) Oral at bedtime    MEDICATIONS  (PRN):  cyclobenzaprine 10 milliGRAM(s) Oral every 8 hours PRN Muscle Spasm  dextrose Oral Gel 15 Gram(s) Oral once PRN Blood Glucose LESS THAN 70 milliGRAM(s)/deciliter  ondansetron   Disintegrating Tablet 4 milliGRAM(s) Oral every 6 hours PRN Nausea and/or Vomiting  oxyCODONE    IR 10 milliGRAM(s) Oral every 6 hours PRN Severe Pain (7 - 10)  oxyCODONE    IR 5 milliGRAM(s) Oral every 6 hours PRN Moderate Pain (4 - 6)  polyethylene glycol 3350 17 Gram(s) Oral daily PRN Constipation    Allergies    morphine (Unknown)  pain medicine, name unknown (Unknown)  penicillins (Unknown)    Intolerances      Vital Signs Last 24 Hrs  T(C): 36.9 (03 Feb 2023 04:45), Max: 36.9 (03 Feb 2023 04:45)  T(F): 98.4 (03 Feb 2023 04:45), Max: 98.4 (03 Feb 2023 04:45)  HR: 92 (03 Feb 2023 08:30) (72 - 92)  BP: 125/84 (03 Feb 2023 08:30) (120/71 - 134/75)  BP(mean): --  RR: 19 (03 Feb 2023 04:45) (18 - 19)  SpO2: 96% (03 Feb 2023 04:45) (93% - 96%)    Parameters below as of 03 Feb 2023 04:45  Patient On (Oxygen Delivery Method): room air      I&O's Summary    02 Feb 2023 07:01  -  03 Feb 2023 07:00  --------------------------------------------------------  IN: 0 mL / OUT: 1480 mL / NET: -1480 mL      TELE: Not on telemetry   PHYSICAL EXAM:  Constitutional: NAD, awake and alert, obese  HEENT: Moist Mucous Membranes, Anicteric  Pulmonary: Non-labored, breath sounds are clear bilaterally, No wheezing, rales or rhonchi  Cardiovascular: Regular, S1 and S2, No murmurs, rubs, gallops or clicks  Gastrointestinal: Bowel Sounds present, soft, nontender.   Lymph: No peripheral edema. No lymphadenopathy.   Skin: No visible rashes or ulcers.+ juan drain   Psych:  Mood & affect appropriate      LABS: All Labs Reviewed:                        13.1   13.32 )-----------( 275      ( 03 Feb 2023 08:15 )             42.3                         13.3   14.79 )-----------( 311      ( 02 Feb 2023 08:05 )             42.7                         13.7   11.37 )-----------( 333      ( 01 Feb 2023 22:00 )             44.3     02 Feb 2023 08:05    131    |  95     |  15     ----------------------------<  265    4.9     |  26     |  1.30   01 Feb 2023 22:00    131    |  96     |  16     ----------------------------<  271    4.9     |  27     |  1.30   01 Feb 2023 05:25    136    |  102    |  16     ----------------------------<  160    4.0     |  28     |  0.98     Ca    8.9        02 Feb 2023 08:05  Ca    9.1        01 Feb 2023 22:00  Ca    9.4        01 Feb 2023 05:25      PT/INR - ( 01 Feb 2023 13:10 )   PT: 13.9 sec;   INR: 1.19 ratio               12 Lead ECG:   Ventricular Rate 76 BPM    Atrial Rate 76 BPM    P-R Interval 164 ms    QRS Duration 100 ms    Q-T Interval 360 ms    QTC Calculation(Bazett) 405 ms    P Axis 38 degrees    R Axis -4 degrees    T Axis 68 degrees    Diagnosis Line Normal sinus rhythm  Minimal voltage criteria for LVH, may be normal variant ( [place] product )  T wave abnormality, consider lateral ischemia  Abnormal ECG  No previous ECGs available  Confirmed by Chidi Lopez MD ([id number]) on 1/31/2023 4:05:19 PM (01-31-23 @ 14:21)      ACC: [id number] EXAM:  ECHO TTE WO CON COMP W DOPP   ORDERED BY:  LILIA BHANDARI     PROCEDURE DATE:  02/01/2023          INTERPRETATION:  INDICATION: Preop  Sonographer    Blood Pressure unavailable    Height 175 cm     Weight 127 kg       BSA   2.4 sq m    Dimensions:  LA 3.9       Normal Values: 2.0 - 4.0 cm  Ao 3.7        Normal Values: 2.0 - 3.8 cm  SEPTUM 1.7       Normal Values: 0.6 - 1.2 cm  PWT 0.9       Normal Values: 0.6 - 1.1 cm  LVIDd 5.7         Normal Values: 3.0 - 5.6 cm  LVIDs 4.0       Normal Values: 1.8 - 4.0 cm      OBSERVATIONS:  Technically difficult study  Mitral Valve: normal, trace physiologic MR.  Aortic Valve/Aorta: normal trileaflet aortic valve.  Tricuspid Valve: normal with trace TR.  Pulmonic Valve: Not well-visualized  Left Atrium: normal  Right Atrium: Not well-visualized  Left Ventricle: Low-normal left ventricular systolic function, estimated   LVEF of 50-55%. Basal septal hypertrophy is noted. Cannot rule out   segmental abnormalities  Right Ventricle: Not well-visualized  Pericardium: no significant pericardial effusion.        IMPRESSION:  Technically difficult study  Low-normal left ventricular systolic function, estimated LVEF of 50-55%.   Basal septal hypertrophy is noted  Right ventricle is not well-visualized  Normal trileaflet aortic valve, without AI.  Trace physiologic MR and TR.  No significant pericardial effusion.    --- End of Report ---            ROBERTA LUKE MD; Attending Cardiologist  This document has been electronicallysigned. Feb 1 2023  9:59AM     [facility] -- Shruthi Cedillo,  Jessica, Edwin Diamond Savella, [place]  Office # [phone]    Follow Up:      Subjective/Observations:     REVIEW OF SYSTEMS: All other review of systems is negative unless indicated above  PAST MEDICAL & SURGICAL HISTORY:  Hypertension      CAP (community acquired pneumonia)      No significant past surgical history        MEDICATIONS  (STANDING):  acetaminophen     Tablet .. 650 milliGRAM(s) Oral every 6 hours  BUpivacaine liposome 1.3% Injectable 20 milliLiter(s) Local Injection once  dextrose 5%. 1000 milliLiter(s) (50 mL/Hr) IV Continuous <Continuous>  dextrose 5%. 1000 milliLiter(s) (100 mL/Hr) IV Continuous <Continuous>  dextrose 50% Injectable 25 Gram(s) IV Push once  dextrose 50% Injectable 12.5 Gram(s) IV Push once  dextrose 50% Injectable 25 Gram(s) IV Push once  glucagon  Injectable 1 milliGRAM(s) IntraMuscular once  influenza   Vaccine 0.5 milliLiter(s) IntraMuscular once  insulin lispro (ADMELOG) corrective regimen sliding scale   SubCutaneous three times a day before meals  insulin lispro (ADMELOG) corrective regimen sliding scale   SubCutaneous at bedtime  multivitamin 1 Tablet(s) Oral daily  pantoprazole    Tablet 40 milliGRAM(s) Oral before breakfast  senna 2 Tablet(s) Oral at bedtime    MEDICATIONS  (PRN):  cyclobenzaprine 10 milliGRAM(s) Oral every 8 hours PRN Muscle Spasm  dextrose Oral Gel 15 Gram(s) Oral once PRN Blood Glucose LESS THAN 70 milliGRAM(s)/deciliter  ondansetron   Disintegrating Tablet 4 milliGRAM(s) Oral every 6 hours PRN Nausea and/or Vomiting  oxyCODONE    IR 10 milliGRAM(s) Oral every 6 hours PRN Severe Pain (7 - 10)  oxyCODONE    IR 5 milliGRAM(s) Oral every 6 hours PRN Moderate Pain (4 - 6)  polyethylene glycol 3350 17 Gram(s) Oral daily PRN Constipation    Allergies    morphine (Unknown)  pain medicine, name unknown (Unknown)  penicillins (Unknown)    Intolerances      Vital Signs Last 24 Hrs  T(C): 36.9 (03 Feb 2023 04:45), Max: 36.9 (03 Feb 2023 04:45)  T(F): 98.4 (03 Feb 2023 04:45), Max: 98.4 (03 Feb 2023 04:45)  HR: 92 (03 Feb 2023 08:30) (72 - 92)  BP: 125/84 (03 Feb 2023 08:30) (120/71 - 134/75)  BP(mean): --  RR: 19 (03 Feb 2023 04:45) (18 - 19)  SpO2: 96% (03 Feb 2023 04:45) (93% - 96%)    Parameters below as of 03 Feb 2023 04:45  Patient On (Oxygen Delivery Method): room air      I&O's Summary    02 Feb 2023 07:01  -  03 Feb 2023 07:00  --------------------------------------------------------  IN: 0 mL / OUT: 1480 mL / NET: -1480 mL        TELE:   PHYSICAL EXAM:  Constitutional: NAD, awake and alert, well-developed  HEENT: Moist Mucous Membranes, Anicteric  Pulmonary: Non-labored, breath sounds are clear bilaterally, No wheezing, rales or rhonchi  Cardiovascular: Regular, S1 and S2, No murmurs, rubs, gallops or clicks  Gastrointestinal: Bowel Sounds present, soft, nontender.   Lymph: No peripheral edema. No lymphadenopathy.  Skin: No visible rashes or ulcers.  Psych:  Mood & affect appropriate  LABS: All Labs Reviewed:                        13.1   13.32 )-----------( 275      ( 03 Feb 2023 08:15 )             42.3                         13.3   14.79 )-----------( 311      ( 02 Feb 2023 08:05 )             42.7                         13.7   11.37 )-----------( 333      ( 01 Feb 2023 22:00 )             44.3     02 Feb 2023 08:05    131    |  95     |  15     ----------------------------<  265    4.9     |  26     |  1.30   01 Feb 2023 22:00    131    |  96     |  16     ----------------------------<  271    4.9     |  27     |  1.30   01 Feb 2023 05:25    136    |  102    |  16     ----------------------------<  160    4.0     |  28     |  0.98     Ca    8.9        02 Feb 2023 08:05  Ca    9.1        01 Feb 2023 22:00  Ca    9.4        01 Feb 2023 05:25      PT/INR - ( 01 Feb 2023 13:10 )   PT: 13.9 sec;   INR: 1.19 ratio               12 Lead ECG:   Ventricular Rate 76 BPM    Atrial Rate 76 BPM    P-R Interval 164 ms    QRS Duration 100 ms    Q-T Interval 360 ms    QTC Calculation(Bazett) 405 ms    P Axis 38 degrees    R Axis -4 degrees    T Axis 68 degrees    Diagnosis Line Normal sinus rhythm  Minimal voltage criteria for LVH, may be normal variant ( [place] product )  T wave abnormality, consider lateral ischemia  Abnormal ECG  No previous ECGs available  Confirmed by Chidi Lopez MD ([id number]) on 1/31/2023 4:05:19 PM (01-31-23 @ 14:21)      ACC: [id number] EXAM:  ECHO TTE WO CON COMP W DOPP   ORDERED BY:  LILIA BHANDARI     PROCEDURE DATE:  02/01/2023          INTERPRETATION:  INDICATION: Preop  Sonographer    Blood Pressure unavailable    Height 175 cm     Weight 127 kg       BSA   2.4 sq m    Dimensions:  LA 3.9       Normal Values: 2.0 - 4.0 cm  Ao 3.7        Normal Values: 2.0 - 3.8 cm  SEPTUM 1.7       Normal Values: 0.6 - 1.2 cm  PWT 0.9       Normal Values: 0.6 - 1.1 cm  LVIDd 5.7         Normal Values: 3.0 - 5.6 cm  LVIDs 4.0       Normal Values: 1.8 - 4.0 cm      OBSERVATIONS:  Technically difficult study  Mitral Valve: normal, trace physiologic MR.  Aortic Valve/Aorta: normal trileaflet aortic valve.  Tricuspid Valve: normal with trace TR.  Pulmonic Valve: Not well-visualized  Left Atrium: normal  Right Atrium: Not well-visualized  Left Ventricle: Low-normal left ventricular systolic function, estimated   LVEF of 50-55%. Basal septal hypertrophy is noted. Cannot rule out   segmental abnormalities  Right Ventricle: Not well-visualized  Pericardium: no significant pericardial effusion.        IMPRESSION:  Technically difficult study  Low-normal left ventricular systolic function, estimated LVEF of 50-55%.   Basal septal hypertrophy is noted  Right ventricle is not well-visualized  Normal trileaflet aortic valve, without AI.  Trace physiologic MR and TR.  No significant pericardial effusion.    --- End of Report ---            ROBERTA LUKE MD; Attending Cardiologist  This document has been electronicallysigned. Feb 1 2023  9:59AM

## 2023-02-03 NOTE — PROGRESS NOTE ADULT - ASSESSMENT
52M with PMHx CAP, obesity , chronic diastolic CHF, HTN presenting for  L4-L5 posterior spinal decompression.    Cardiac clearance  - pt with  L4-L5 posterior spinal decompression s/p surgery 2/1. tolerated procedure well from cv standpoint  - ortho following, reports back pain is improved, + juan drain    - EKG:SR 76bpm with TWI lateral. No prior EKG for comparison   - Denies anginal complaints  - per patient he follows with a cardiologist (he cannot remember the name) in  [facility], called and attempted to get records from [facility] unable to secure OSH records   - continue statin     - TTE(2/1/2023)Low-normal left ventricular systolic function, estimated LVEF of 50-55%.   - No sign of volume overload, lying flat in bed, on room air  - CXR: no effusion or pneumothorax     - BP, HR stable and controlled  - continue home medications Coreg, Ace, Aldactone    - Dc planning per primary team  - Monitor and replete lytes, keep K>4, Mg>2.  - Will continue to follow.    Teja Eldridge NP  Nurse Practitioner- Cardiology   Spectra #[phone]

## 2023-02-03 NOTE — PROGRESS NOTE ADULT - ASSESSMENT
51yo M w/ PMH of HTN, CAP, morbid obesity, chronic diastolic CHF presents for surgery scheduled for 2/1 with Dr. Capone for L4-5 Posterior spinal decompression. Medicine consulted for pre-surgical optimization, now s/p L4-5 posterior spinal decompression POD#1.     Problem/Recommendation - 1:  ·  Problem: Lumbar radiculopathy, right.   ·  Pt presents with right sided lumbar radiculopathy and is now POD2 L4-5 posterior spinal decompression  - no cord compression or cauda equina symptoms or any other red flag signs  - Dr. Capone (ortho spine) is primary, c/w ESTELLE drains and remove as per ortho reccs. Cont with PT OT and pain control. Incentive spirometry as tolerated.   - Dr. Lechuga group (cardio), as per eval post op everything is stable from cardiac perspective.   - Once drains are removed pt can be discharged as per Ortho team. Pt is medically stable for discharge.      Problem/Recommendation - 2:  ·  Problem: Hypertension.   ·  Recommendation: c/w carvedilol, and spironolactone.  - Can restart lisinopril home med on discharge     Problem/Recommendation - 3:  ·  Problem: newly diagnosed Type 2 DM.   ·  Recommendation: Blood glucose in low-mid 200s  - pt states he does not have a history of diabetes, but did not see physicians  - continue to monitor  - Hgb A1c of 7.5%  - diabetes education/teaching - consulted diabetes NP team, Weil, recs appreciated  - FSG q AC&HS post-operatively with low-dose lispro ISS   - Follow up with PCP and Endo as outpt for DM management      Problem/Recommendation - 4:  ·  Problem: JUSTYNA  ·  Recommendation: Cr 1.5 on admission and improved to 0.98 with hydration  - likely was prerenal, currently 1.3, which might be ~pt's baseline  - monitor BMP     Problem/Recommendation - 5:  ·  Problem: Microcytic anemia.   ·  Recommendation: Hgb 12.9, MCV 75.7  - AM iron, TIBC, ferritin, B12, Folate technically wnl but given the low MCV and the TIBC of 362, pt likely has some iron deficiency -- rec starting ferrous sulfate  - f/u CBC  - patient has never had a colonoscopy he was instructed to establish care with a GI doctor as an outpatient and schedule a screening colonoscopy within 1-2 months given his age and microcytic anemia with likely iron deficiency     Problem/Recommendation - 6:  ·  Problem: Pulmonary nodule.   ·  Recommendation: - Chest X ray- 6 mm nodule of indeterminate etiology RIGHT lower zone periphery.  - No airspace consolidation.  - As no prior radiographs available for comparison follow-up chest radiographs in 6 months and one year to confirm stability or CT chest to confirm benignity.  - patient informed of findings and instructed to f/u with his PCP in 1 month to schedule repeat chest imaging, CT scan to ensure, stability of nodule. Discussed that importance of follow up to ensure nodule does not grow in size and to evaluate for potential for malignancy.     Problem/Recommendation - 7:  ·  Problem: Need for prophylactic measure.   ·  Recommendation: VTE ppx: Hold chemical prophylaxis per ortho spine, c/w SCDs

## 2023-02-03 NOTE — PROGRESS NOTE ADULT - SUBJECTIVE AND OBJECTIVE BOX
Patient seen and examined at bedside. Reports pain is improved after surgery. Endorses mild blanca-incisional pain which is well controlled with current regimen. No acute complaints at this time. Pain well controlled. Denies new weakness, numbness or tingling. Denies chest pain, shortness of breath, nausea or vomiting.     PE:    General: NAD, resting comfortably in bed  Dressing C/D/I  ESTELLE in place, minimal SS drainage  positive babinski bilat  negative avelar, clonus    Motor:                   L2                  L3             L4              L5            S1  R            5/5                5/5             5/5            5/5          5/5  L             5/5                5/5            5/5            5/5          5/5    Sensory:             L2          L3         L4      L5       S1         (0=absent, 1=impaired, 2=normal, NT=not testable)  R         2            2            2        2        2  L          2            2           2        2         2    LABS:                        13.3   14.79 )-----------( 311      ( 02 Feb 2023 08:05 )             42.7     02-02    131<L>  |  95<L>  |  15  ----------------------------<  265<H>  4.9   |  26  |  1.30    Ca    8.9      02 Feb 2023 08:05      PT/INR - ( 01 Feb 2023 13:10 )   PT: 13.9 sec;   INR: 1.19 ratio               VITAL SIGNS:  T(C): 36.9 (02-03-23 @ 04:45), Max: 36.9 (02-03-23 @ 04:45)  HR: 92 (02-03-23 @ 04:45) (72 - 92)  BP: 130/79 (02-03-23 @ 04:45) (120/71 - 134/75)  RR: 19 (02-03-23 @ 04:45) (18 - 19)  SpO2: 96% (02-03-23 @ 04:45) (93% - 96%)      A/P:  52y M s/p L4-5 decompression POD 2  -PT/OT   -WBAT  -ESTELLE 20/60  -please record drain output qSHIFT  -Pain Control  -DVT ppx with SCDs  -FU AM Labs  -Incentive Spirometry  -Medical comanagement appreciated  -dispo: home once drain is out Patient endorses moderate surgical site pain, which is well controlled.  He reports resolution of his preoperative radicular symptoms.  He denies any other complaints this time.    PE:    General: NAD, resting comfortably in bed  Dressing C/D/I  Drain in place, minimal SS drainage  negative avelar, clonus    Motor:                   L2                  L3             L4              L5            S1  R            5/5                5/5             5/5            5/5          5/5  L             5/5                5/5            5/5            5/5          5/5    Sensory:             L2          L3         L4      L5       S1         (0=absent, 1=impaired, 2=normal, NT=not testable)  R         2            2            2        2        2  L          2            2           2        2         2    LABS:                        13.3   14.79 )-----------( 311      ( 02 Feb 2023 08:05 )             42.7     02-02    131<L>  |  95<L>  |  15  ----------------------------<  265<H>  4.9   |  26  |  1.30    Ca    8.9      02 Feb 2023 08:05      PT/INR - ( 01 Feb 2023 13:10 )   PT: 13.9 sec;   INR: 1.19 ratio               VITAL SIGNS:  T(C): 36.9 (02-03-23 @ 04:45), Max: 36.9 (02-03-23 @ 04:45)  HR: 92 (02-03-23 @ 04:45) (72 - 92)  BP: 130/79 (02-03-23 @ 04:45) (120/71 - 134/75)  RR: 19 (02-03-23 @ 04:45) (18 - 19)  SpO2: 96% (02-03-23 @ 04:45) (93% - 96%)      A/P:  52y M s/p L4-5 decompression POD 2  -PT/OT   -WBAT  -ESTELLE 20/60  -please record drain output qSHIFT  -Pain Control  -DVT ppx with SCDs  -FU AM Labs  -Incentive Spirometry  -Medical comanagement appreciated  -dispo: home once drain is out

## 2023-02-03 NOTE — PROGRESS NOTE ADULT - SUBJECTIVE AND OBJECTIVE BOX
Patient is a 52y old  Male who presents with a chief complaint of intermittent numbness/tingling/weakness in right leg       INTERVAL HPI/OVERNIGHT EVENTS: Pt reports back pain post-op and has difficulty getting up due to the pain. Denies fever, chills, SOB, CP, abd pain, n/v.    MEDICATIONS  (STANDING):  acetaminophen     Tablet .. 650 milliGRAM(s) Oral every 6 hours  BUpivacaine liposome 1.3% Injectable 20 milliLiter(s) Local Injection once  dextrose 5%. 1000 milliLiter(s) (100 mL/Hr) IV Continuous <Continuous>  dextrose 5%. 1000 milliLiter(s) (50 mL/Hr) IV Continuous <Continuous>  dextrose 50% Injectable 25 Gram(s) IV Push once  dextrose 50% Injectable 12.5 Gram(s) IV Push once  dextrose 50% Injectable 25 Gram(s) IV Push once  glucagon  Injectable 1 milliGRAM(s) IntraMuscular once  influenza   Vaccine 0.5 milliLiter(s) IntraMuscular once  insulin lispro (ADMELOG) corrective regimen sliding scale   SubCutaneous three times a day before meals  insulin lispro (ADMELOG) corrective regimen sliding scale   SubCutaneous at bedtime  multivitamin 1 Tablet(s) Oral daily  pantoprazole    Tablet 40 milliGRAM(s) Oral before breakfast  senna 2 Tablet(s) Oral at bedtime    MEDICATIONS  (PRN):  cyclobenzaprine 10 milliGRAM(s) Oral every 8 hours PRN Muscle Spasm  dextrose Oral Gel 15 Gram(s) Oral once PRN Blood Glucose LESS THAN 70 milliGRAM(s)/deciliter  ondansetron   Disintegrating Tablet 4 milliGRAM(s) Oral every 6 hours PRN Nausea and/or Vomiting  oxyCODONE    IR 10 milliGRAM(s) Oral every 6 hours PRN Severe Pain (7 - 10)  oxyCODONE    IR 5 milliGRAM(s) Oral every 6 hours PRN Moderate Pain (4 - 6)  polyethylene glycol 3350 17 Gram(s) Oral daily PRN Constipation        Allergies    morphine (Unknown)  pain medicine, name unknown (Unknown)  penicillins (Unknown)    Intolerances        REVIEW OF SYSTEMS:  CONSTITUTIONAL: No fever or chills  HEENT:  No headache, no sore throat  RESPIRATORY: No cough, wheezing, or shortness of breath  CARDIOVASCULAR: No chest pain, palpitations  GASTROINTESTINAL: No abd pain, nausea, vomiting, or diarrhea  GENITOURINARY: No dysuria, frequency, or hematuria  NEUROLOGICAL: denies weakness or numbness ; no dizziness  MUSCULOSKELETAL: no myalgias ; ++back pain post-operatively    Vital Signs Last 24 Hrs  T(C): 36.7 (03 Feb 2023 12:22), Max: 36.9 (03 Feb 2023 04:45)  T(F): 98.1 (03 Feb 2023 12:22), Max: 98.4 (03 Feb 2023 04:45)  HR: 83 (03 Feb 2023 12:22) (82 - 92)  BP: 109/73 (03 Feb 2023 12:22) (109/73 - 130/79)  BP(mean): --  RR: 18 (03 Feb 2023 12:22) (18 - 19)  SpO2: 93% (03 Feb 2023 12:22) (93% - 96%)    Parameters below as of 03 Feb 2023 12:22  Patient On (Oxygen Delivery Method): room air      PHYSICAL EXAM:  GENERAL: NAD, morbidly obese  HEENT:  anicteric, moist mucous membranes  CHEST/LUNG:  CTA b/l, no rales, wheezes, or rhonchi  HEART:  RRR, S1, S2  ABDOMEN:  BS+, soft, nontender, nondistended  EXTREMITIES: no edema, cyanosis, or calf tenderness  BACK: orthopedic dressing in place, C/D/I; ESTELLE drain in place  NERVOUS SYSTEM: answers questions and follows commands appropriately ; sensation to touch intact b/l LEs; strength 5/5 b/l       02-03    135  |  99  |  19  ----------------------------<  172<H>  4.1   |  30  |  1.20    Ca    9.1      03 Feb 2023 08:15                            13.1   13.32 )-----------( 275      ( 03 Feb 2023 08:15 )             42.3         RADIOLOGY & ADDITIONAL TESTS:    Personally reviewed.     Consultant(s) Notes Reviewed:  [x] YES  [ ] NO

## 2023-02-04 VITALS
HEART RATE: 98 BPM | RESPIRATION RATE: 18 BRPM | SYSTOLIC BLOOD PRESSURE: 155 MMHG | DIASTOLIC BLOOD PRESSURE: 79 MMHG | TEMPERATURE: 98 F | OXYGEN SATURATION: 95 %

## 2023-02-04 LAB
ANION GAP SERPL CALC-SCNC: 7 MMOL/L — SIGNIFICANT CHANGE UP (ref 5–17)
BASOPHILS # BLD AUTO: 0.06 K/UL — SIGNIFICANT CHANGE UP (ref 0–0.2)
BASOPHILS NFR BLD AUTO: 0.6 % — SIGNIFICANT CHANGE UP (ref 0–2)
BUN SERPL-MCNC: 15 MG/DL — SIGNIFICANT CHANGE UP (ref 7–23)
CALCIUM SERPL-MCNC: 9.3 MG/DL — SIGNIFICANT CHANGE UP (ref 8.5–10.1)
CHLORIDE SERPL-SCNC: 101 MMOL/L — SIGNIFICANT CHANGE UP (ref 96–108)
CO2 SERPL-SCNC: 28 MMOL/L — SIGNIFICANT CHANGE UP (ref 22–31)
CREAT SERPL-MCNC: 0.95 MG/DL — SIGNIFICANT CHANGE UP (ref 0.5–1.3)
EGFR: 96 ML/MIN/1.73M2 — SIGNIFICANT CHANGE UP
EOSINOPHIL # BLD AUTO: 0.1 K/UL — SIGNIFICANT CHANGE UP (ref 0–0.5)
EOSINOPHIL NFR BLD AUTO: 0.9 % — SIGNIFICANT CHANGE UP (ref 0–6)
GLUCOSE SERPL-MCNC: 168 MG/DL — HIGH (ref 70–99)
HCT VFR BLD CALC: 43.6 % — SIGNIFICANT CHANGE UP (ref 39–50)
HGB BLD-MCNC: 13.3 G/DL — SIGNIFICANT CHANGE UP (ref 13–17)
IMM GRANULOCYTES NFR BLD AUTO: 0.5 % — SIGNIFICANT CHANGE UP (ref 0–0.9)
LYMPHOCYTES # BLD AUTO: 29.3 % — SIGNIFICANT CHANGE UP (ref 13–44)
LYMPHOCYTES # BLD AUTO: 3.11 K/UL — SIGNIFICANT CHANGE UP (ref 1–3.3)
MCHC RBC-ENTMCNC: 23.4 PG — LOW (ref 27–34)
MCHC RBC-ENTMCNC: 30.5 GM/DL — LOW (ref 32–36)
MCV RBC AUTO: 76.8 FL — LOW (ref 80–100)
MONOCYTES # BLD AUTO: 1.09 K/UL — HIGH (ref 0–0.9)
MONOCYTES NFR BLD AUTO: 10.3 % — SIGNIFICANT CHANGE UP (ref 2–14)
NEUTROPHILS # BLD AUTO: 6.19 K/UL — SIGNIFICANT CHANGE UP (ref 1.8–7.4)
NEUTROPHILS NFR BLD AUTO: 58.4 % — SIGNIFICANT CHANGE UP (ref 43–77)
NRBC # BLD: 0 /100 WBCS — SIGNIFICANT CHANGE UP (ref 0–0)
PLATELET # BLD AUTO: 252 K/UL — SIGNIFICANT CHANGE UP (ref 150–400)
POTASSIUM SERPL-MCNC: 4.1 MMOL/L — SIGNIFICANT CHANGE UP (ref 3.5–5.3)
POTASSIUM SERPL-SCNC: 4.1 MMOL/L — SIGNIFICANT CHANGE UP (ref 3.5–5.3)
RBC # BLD: 5.68 M/UL — SIGNIFICANT CHANGE UP (ref 4.2–5.8)
RBC # FLD: 16.8 % — HIGH (ref 10.3–14.5)
SODIUM SERPL-SCNC: 136 MMOL/L — SIGNIFICANT CHANGE UP (ref 135–145)
WBC # BLD: 10.6 K/UL — HIGH (ref 3.8–10.5)
WBC # FLD AUTO: 10.6 K/UL — HIGH (ref 3.8–10.5)

## 2023-02-04 PROCEDURE — 82746 ASSAY OF FOLIC ACID SERUM: CPT

## 2023-02-04 PROCEDURE — 36415 COLL VENOUS BLD VENIPUNCTURE: CPT

## 2023-02-04 PROCEDURE — U0005: CPT

## 2023-02-04 PROCEDURE — 99232 SBSQ HOSP IP/OBS MODERATE 35: CPT

## 2023-02-04 PROCEDURE — 85610 PROTHROMBIN TIME: CPT

## 2023-02-04 PROCEDURE — 71045 X-RAY EXAM CHEST 1 VIEW: CPT

## 2023-02-04 PROCEDURE — 99285 EMERGENCY DEPT VISIT HI MDM: CPT

## 2023-02-04 PROCEDURE — 85027 COMPLETE CBC AUTOMATED: CPT

## 2023-02-04 PROCEDURE — 85025 COMPLETE CBC W/AUTO DIFF WBC: CPT

## 2023-02-04 PROCEDURE — 97116 GAIT TRAINING THERAPY: CPT

## 2023-02-04 PROCEDURE — 93005 ELECTROCARDIOGRAM TRACING: CPT

## 2023-02-04 PROCEDURE — 97166 OT EVAL MOD COMPLEX 45 MIN: CPT

## 2023-02-04 PROCEDURE — 83036 HEMOGLOBIN GLYCOSYLATED A1C: CPT

## 2023-02-04 PROCEDURE — 85730 THROMBOPLASTIN TIME PARTIAL: CPT

## 2023-02-04 PROCEDURE — 93306 TTE W/DOPPLER COMPLETE: CPT

## 2023-02-04 PROCEDURE — U0003: CPT

## 2023-02-04 PROCEDURE — 80048 BASIC METABOLIC PNL TOTAL CA: CPT

## 2023-02-04 PROCEDURE — 82728 ASSAY OF FERRITIN: CPT

## 2023-02-04 PROCEDURE — 76000 FLUOROSCOPY <1 HR PHYS/QHP: CPT

## 2023-02-04 PROCEDURE — 97535 SELF CARE MNGMENT TRAINING: CPT

## 2023-02-04 PROCEDURE — 86901 BLOOD TYPING SEROLOGIC RH(D): CPT

## 2023-02-04 PROCEDURE — 86850 RBC ANTIBODY SCREEN: CPT

## 2023-02-04 PROCEDURE — 83550 IRON BINDING TEST: CPT

## 2023-02-04 PROCEDURE — 82607 VITAMIN B-12: CPT

## 2023-02-04 PROCEDURE — C1889: CPT

## 2023-02-04 PROCEDURE — 83540 ASSAY OF IRON: CPT

## 2023-02-04 PROCEDURE — 97162 PT EVAL MOD COMPLEX 30 MIN: CPT

## 2023-02-04 PROCEDURE — 86900 BLOOD TYPING SEROLOGIC ABO: CPT

## 2023-02-04 PROCEDURE — 82962 GLUCOSE BLOOD TEST: CPT

## 2023-02-04 PROCEDURE — 97530 THERAPEUTIC ACTIVITIES: CPT

## 2023-02-04 RX ADMIN — PANTOPRAZOLE SODIUM 40 MILLIGRAM(S): 20 TABLET, DELAYED RELEASE ORAL at 05:37

## 2023-02-04 RX ADMIN — Medication 650 MILLIGRAM(S): at 00:58

## 2023-02-04 RX ADMIN — OXYCODONE HYDROCHLORIDE 10 MILLIGRAM(S): 5 TABLET ORAL at 05:38

## 2023-02-04 RX ADMIN — Medication 650 MILLIGRAM(S): at 00:28

## 2023-02-04 RX ADMIN — Medication 650 MILLIGRAM(S): at 05:38

## 2023-02-04 RX ADMIN — Medication 650 MILLIGRAM(S): at 11:34

## 2023-02-04 RX ADMIN — Medication 1 TABLET(S): at 11:04

## 2023-02-04 RX ADMIN — OXYCODONE HYDROCHLORIDE 10 MILLIGRAM(S): 5 TABLET ORAL at 12:05

## 2023-02-04 RX ADMIN — Medication 650 MILLIGRAM(S): at 06:08

## 2023-02-04 RX ADMIN — Medication 1: at 12:05

## 2023-02-04 RX ADMIN — OXYCODONE HYDROCHLORIDE 10 MILLIGRAM(S): 5 TABLET ORAL at 06:08

## 2023-02-04 RX ADMIN — Medication 1: at 08:22

## 2023-02-04 RX ADMIN — Medication 650 MILLIGRAM(S): at 11:04

## 2023-02-04 NOTE — DIETITIAN INITIAL EVALUATION ADULT - ORAL INTAKE PTA/DIET HISTORY
patient reports eating well here. states no shellfish in diet . reports no problems chewing swallowing. states mother of  his children prepares his meals.  apparently new DM type 2 diagnosis A1c 7.5% . reports follow no special diet at home. drinks juice and soda regular, does not take coffee.   verbal/written review at this time low Na consistent cho diet emphasizing portion control and whole grains and avoiding concentrated sweets.

## 2023-02-04 NOTE — DIETITIAN NUTRITION RISK NOTIFICATION - TREATMENT: THE FOLLOWING DIET HAS BEEN RECOMMENDED
Diet, Regular:   Consistent Carbohydrate {Evening Snack}  DASH/TLC {Sodium & Cholesterol Restricted} (02-02-23 @ 07:33) [Active]

## 2023-02-04 NOTE — PROGRESS NOTE ADULT - SUBJECTIVE AND OBJECTIVE BOX
Patient is a 52y old  Male who presents with a chief complaint of intermittent numbness/tingling/weakness in right leg       INTERVAL HPI/OVERNIGHT EVENTS: Pt reports back pain post-op and has difficulty getting up due to the pain. Denies fever, chills, SOB, CP, abd pain, n/v.    MEDICATIONS  (STANDING):  acetaminophen     Tablet .. 650 milliGRAM(s) Oral every 6 hours  BUpivacaine liposome 1.3% Injectable 20 milliLiter(s) Local Injection once  dextrose 5%. 1000 milliLiter(s) (50 mL/Hr) IV Continuous <Continuous>  dextrose 5%. 1000 milliLiter(s) (100 mL/Hr) IV Continuous <Continuous>  dextrose 50% Injectable 25 Gram(s) IV Push once  dextrose 50% Injectable 12.5 Gram(s) IV Push once  dextrose 50% Injectable 25 Gram(s) IV Push once  glucagon  Injectable 1 milliGRAM(s) IntraMuscular once  influenza   Vaccine 0.5 milliLiter(s) IntraMuscular once  insulin lispro (ADMELOG) corrective regimen sliding scale   SubCutaneous three times a day before meals  insulin lispro (ADMELOG) corrective regimen sliding scale   SubCutaneous at bedtime  multivitamin 1 Tablet(s) Oral daily  pantoprazole    Tablet 40 milliGRAM(s) Oral before breakfast  senna 2 Tablet(s) Oral at bedtime    MEDICATIONS  (PRN):  cyclobenzaprine 10 milliGRAM(s) Oral every 8 hours PRN Muscle Spasm  dextrose Oral Gel 15 Gram(s) Oral once PRN Blood Glucose LESS THAN 70 milliGRAM(s)/deciliter  ondansetron   Disintegrating Tablet 4 milliGRAM(s) Oral every 6 hours PRN Nausea and/or Vomiting  oxyCODONE    IR 10 milliGRAM(s) Oral every 6 hours PRN Severe Pain (7 - 10)  oxyCODONE    IR 5 milliGRAM(s) Oral every 6 hours PRN Moderate Pain (4 - 6)  polyethylene glycol 3350 17 Gram(s) Oral daily PRN Constipation      Allergies    morphine (Unknown)  pain medicine, name unknown (Unknown)  penicillins (Unknown)    Intolerances        REVIEW OF SYSTEMS:  CONSTITUTIONAL: No fever or chills  HEENT:  No headache, no sore throat  RESPIRATORY: No cough, wheezing, or shortness of breath  CARDIOVASCULAR: No chest pain, palpitations  GASTROINTESTINAL: No abd pain, nausea, vomiting, or diarrhea  GENITOURINARY: No dysuria, frequency, or hematuria  NEUROLOGICAL: denies weakness or numbness ; no dizziness  MUSCULOSKELETAL: no myalgias ; +back pain post-operatively    Vital Signs Last 24 Hrs  T(C): 36.6 (04 Feb 2023 13:00), Max: 37 (04 Feb 2023 04:51)  T(F): 97.9 (04 Feb 2023 13:00), Max: 98.6 (04 Feb 2023 04:51)  HR: 98 (04 Feb 2023 13:00) (88 - 98)  BP: 155/79 (04 Feb 2023 13:00) (142/87 - 155/79)  BP(mean): --  RR: 18 (04 Feb 2023 13:00) (18 - 18)  SpO2: 95% (04 Feb 2023 13:00) (94% - 95%)    Parameters below as of 04 Feb 2023 13:00  Patient On (Oxygen Delivery Method): room air        PHYSICAL EXAM:  GENERAL: NAD, morbidly obese  HEENT:  anicteric, moist mucous membranes  CHEST/LUNG:  CTA b/l, no rales, wheezes, or rhonchi  HEART:  RRR, S1, S2  ABDOMEN:  BS+, soft, nontender, nondistended  EXTREMITIES: no edema, cyanosis, or calf tenderness  BACK: orthopedic dressing in place, C/D/I   NERVOUS SYSTEM: answers questions and follows commands appropriately ; sensation to touch intact b/l LEs; strength 5/5 b/l         LABS:                                              13.3   10.60 )-----------( 252      ( 04 Feb 2023 06:16 )             43.6     CBC Full  -  ( 04 Feb 2023 06:16 )  WBC Count : 10.60 K/uL  Hemoglobin : 13.3 g/dL  Hematocrit : 43.6 %  Platelet Count - Automated : 252 K/uL  Mean Cell Volume : 76.8 fl  Mean Cell Hemoglobin : 23.4 pg  Mean Cell Hemoglobin Concentration : 30.5 gm/dL  Auto Neutrophil # : 6.19 K/uL  Auto Lymphocyte # : 3.11 K/uL  Auto Monocyte # : 1.09 K/uL  Auto Eosinophil # : 0.10 K/uL  Auto Basophil # : 0.06 K/uL  Auto Neutrophil % : 58.4 %  Auto Lymphocyte % : 29.3 %  Auto Monocyte % : 10.3 %  Auto Eosinophil % : 0.9 %  Auto Basophil % : 0.6 %    02-04    136  |  101  |  15  ----------------------------<  168<H>  4.1   |  28  |  0.95    Ca    9.3      04 Feb 2023 06:16          CAPILLARY BLOOD GLUCOSE      POCT Blood Glucose.: 182 mg/dL (04 Feb 2023 11:43)  POCT Blood Glucose.: 167 mg/dL (04 Feb 2023 07:42)          RADIOLOGY & ADDITIONAL TESTS:    Personally reviewed.     Consultant(s) Notes Reviewed:  [x] YES  [ ] NO     Patient is a 52y old  Male who presents with a chief complaint of intermittent numbness/tingling/weakness in right leg        INTERVAL HPI/OVERNIGHT EVENTS: Pt reports back pain post-op and has difficulty getting up due to the pain. Denies fever, chills, SOB, CP, abd pain, n/v.    MEDICATIONS  (STANDING):  acetaminophen     Tablet .. 650 milliGRAM(s) Oral every 6 hours  BUpivacaine liposome 1.3% Injectable 20 milliLiter(s) Local Injection once  dextrose 5%. 1000 milliLiter(s) (50 mL/Hr) IV Continuous <Continuous>  dextrose 5%. 1000 milliLiter(s) (100 mL/Hr) IV Continuous <Continuous>  dextrose 50% Injectable 25 Gram(s) IV Push once  dextrose 50% Injectable 12.5 Gram(s) IV Push once  dextrose 50% Injectable 25 Gram(s) IV Push once  glucagon  Injectable 1 milliGRAM(s) IntraMuscular once  influenza   Vaccine 0.5 milliLiter(s) IntraMuscular once  insulin lispro (ADMELOG) corrective regimen sliding scale   SubCutaneous three times a day before meals  insulin lispro (ADMELOG) corrective regimen sliding scale   SubCutaneous at bedtime  multivitamin 1 Tablet(s) Oral daily  pantoprazole    Tablet 40 milliGRAM(s) Oral before breakfast  senna 2 Tablet(s) Oral at bedtime    MEDICATIONS  (PRN):  cyclobenzaprine 10 milliGRAM(s) Oral every 8 hours PRN Muscle Spasm  dextrose Oral Gel 15 Gram(s) Oral once PRN Blood Glucose LESS THAN 70 milliGRAM(s)/deciliter  ondansetron   Disintegrating Tablet 4 milliGRAM(s) Oral every 6 hours PRN Nausea and/or Vomiting  oxyCODONE    IR 10 milliGRAM(s) Oral every 6 hours PRN Severe Pain (7 - 10)  oxyCODONE    IR 5 milliGRAM(s) Oral every 6 hours PRN Moderate Pain (4 - 6)  polyethylene glycol 3350 17 Gram(s) Oral daily PRN Constipation      Allergies    morphine (Unknown)  pain medicine, name unknown (Unknown)  penicillins (Unknown)    Intolerances        REVIEW OF SYSTEMS:  CONSTITUTIONAL: No fever or chills  HEENT:  No headache, no sore throat  RESPIRATORY: No cough, wheezing, or shortness of breath  CARDIOVASCULAR: No chest pain, palpitations  GASTROINTESTINAL: No abd pain, nausea, vomiting, or diarrhea  GENITOURINARY: No dysuria, frequency, or hematuria  NEUROLOGICAL: denies weakness or numbness ; no dizziness  MUSCULOSKELETAL: no myalgias ; +back pain post-operatively    Vital Signs Last 24 Hrs  T(C): 36.6 (04 Feb 2023 13:00), Max: 37 (04 Feb 2023 04:51)  T(F): 97.9 (04 Feb 2023 13:00), Max: 98.6 (04 Feb 2023 04:51)  HR: 98 (04 Feb 2023 13:00) (88 - 98)  BP: 155/79 (04 Feb 2023 13:00) (142/87 - 155/79)  BP(mean): --  RR: 18 (04 Feb 2023 13:00) (18 - 18)  SpO2: 95% (04 Feb 2023 13:00) (94% - 95%)    Parameters below as of 04 Feb 2023 13:00  Patient On (Oxygen Delivery Method): room air        PHYSICAL EXAM:  GENERAL: NAD, morbidly obese  HEENT:  anicteric, moist mucous membranes  CHEST/LUNG:  CTA b/l, no rales, wheezes, or rhonchi  HEART:  RRR, S1, S2  ABDOMEN:  BS+, soft, nontender, nondistended  EXTREMITIES: no edema, cyanosis, or calf tenderness  BACK: orthopedic dressing in place, C/D/I   NERVOUS SYSTEM: answers questions and follows commands appropriately ; sensation to touch intact b/l LEs; strength 5/5 b/l         LABS:                                              13.3   10.60 )-----------( 252      ( 04 Feb 2023 06:16 )             43.6     CBC Full  -  ( 04 Feb 2023 06:16 )  WBC Count : 10.60 K/uL  Hemoglobin : 13.3 g/dL  Hematocrit : 43.6 %  Platelet Count - Automated : 252 K/uL  Mean Cell Volume : 76.8 fl  Mean Cell Hemoglobin : 23.4 pg  Mean Cell Hemoglobin Concentration : 30.5 gm/dL  Auto Neutrophil # : 6.19 K/uL  Auto Lymphocyte # : 3.11 K/uL  Auto Monocyte # : 1.09 K/uL  Auto Eosinophil # : 0.10 K/uL  Auto Basophil # : 0.06 K/uL  Auto Neutrophil % : 58.4 %  Auto Lymphocyte % : 29.3 %  Auto Monocyte % : 10.3 %  Auto Eosinophil % : 0.9 %  Auto Basophil % : 0.6 %    02-04    136  |  101  |  15  ----------------------------<  168<H>  4.1   |  28  |  0.95    Ca    9.3      04 Feb 2023 06:16          CAPILLARY BLOOD GLUCOSE      POCT Blood Glucose.: 182 mg/dL (04 Feb 2023 11:43)  POCT Blood Glucose.: 167 mg/dL (04 Feb 2023 07:42)          RADIOLOGY & ADDITIONAL TESTS:    Personally reviewed.     Consultant(s) Notes Reviewed:  [x] YES  [ ] NO

## 2023-02-04 NOTE — PROGRESS NOTE ADULT - ASSESSMENT
52M with PMHx CAP, obesity , chronic diastolic CHF, HTN presenting for  L4-L5 posterior spinal decompression.    Cardiac clearance  - pt with  L4-L5 posterior spinal decompression s/p surgery 2/1. tolerated procedure well from cv standpoint  - ortho following, reports back pain is improved, + juan drain    - EKG:SR 76bpm with TWI lateral. No prior EKG for comparison   - per patient he follows with a cardiologist (he cannot remember the name) in  [facility], called and attempted to get records from [facility] unable to secure OSH records   - Can resume home statin     - TTE(2/1/2023)Low-normal left ventricular systolic function, estimated LVEF of 50-55%.   - No sign of volume overload, lying flat in bed, on room air    - BP, HR stable and controlled  - Holding home antihypertensives     - Monitor and replete lytes, keep K>4, Mg>2.  - Will continue to follow.    Deuce Zaldivar, MS FNP, [facility]  Nurse Practitioner- Cardiology   Spectra #[id number] /[phone]

## 2023-02-04 NOTE — PROGRESS NOTE ADULT - NS ATTEND BILL GEN_ALL_CORE
Signed order for new glucometer.     Cassy Berrios MD    
Attending to bill

## 2023-02-04 NOTE — DIETITIAN INITIAL EVALUATION ADULT - PERTINENT LABORATORY DATA
02-04    136  |  101  |  15  ----------------------------<  168<H>  4.1   |  28  |  0.95    Ca    9.3      04 Feb 2023 06:16    POCT Blood Glucose.: 167 mg/dL (02-04-23 @ 07:42)  A1C with Estimated Average Glucose Result: 7.5 % (02-01-23 @ 05:25)

## 2023-02-04 NOTE — PROGRESS NOTE ADULT - SUBJECTIVE AND OBJECTIVE BOX
Patient seen and examined at bedside. Reports pain is improved after surgery. Endorses mild blanca-incisional pain which is well controlled with current regimen. No acute complaints at this time. Pain well controlled. Denies new weakness, numbness or tingling. Denies chest pain, shortness of breath, nausea or vomiting.     Vital Signs Last 24 Hrs  T(C): 37 (04 Feb 2023 04:51), Max: 37 (04 Feb 2023 04:51)  T(F): 98.6 (04 Feb 2023 04:51), Max: 98.6 (04 Feb 2023 04:51)  HR: 88 (04 Feb 2023 04:51) (83 - 92)  BP: 142/87 (04 Feb 2023 04:51) (109/73 - 142/87)  BP(mean): --  RR: 18 (04 Feb 2023 04:51) (18 - 18)  SpO2: 94% (04 Feb 2023 04:51) (92% - 94%)    Parameters below as of 04 Feb 2023 04:51  Patient On (Oxygen Delivery Method): room air      PE:    General: NAD, resting comfortably in bed  Dressing C/D/I  ESTELLE in place, SS drainage  positive babinski bilat  negative avelar, clonus    Motor:                   C5                C6              C7               C8           T1   R            5/5                5/5            5/5             5/5          5/5  L             5/5               5/5             5/5             5/5          5/5                L2             L3             L4               L5            S1  R         5/5           5/5          5/5             5/5           5/5  L          5/5          5/5           5/5             5/5           5/5    Sensory:            C5         C6         C7      C8       T1        (0=absent, 1=impaired, 2=normal, NT=not testable)  R         2            2           2        2         2  L          2            2           2        2         2               L2          L3         L4      L5       S1         (0=absent, 1=impaired, 2=normal, NT=not testable)  R         2            2            2        2        2  L          2            2           2        2         2                              13.1   13.32 )-----------( 275      ( 03 Feb 2023 08:15 )             42.3       02-03    135  |  99  |  19  ----------------------------<  172<H>  4.1   |  30  |  1.20    Ca    9.1      03 Feb 2023 08:15        A/P:  52y M s/p L4-5 decompression POD 3  -PT/OT   -WBAT  -Pain Control  -DVT ppx with SCDs  -FU AM Labs  -Incentive Spirometry  -Medical comanagement appreciated  -dispo: home   - Plan to DC drain today and DC home with outpatient follow up  - Ortho stable  Patient endorses moderate surgical site pain, which is well controlled.  He reports resolution of his preoperative radicular symptoms.  He denies any other complaints this time.    Vital Signs Last 24 Hrs  T(C): 37 (04 Feb 2023 04:51), Max: 37 (04 Feb 2023 04:51)  T(F): 98.6 (04 Feb 2023 04:51), Max: 98.6 (04 Feb 2023 04:51)  HR: 88 (04 Feb 2023 04:51) (83 - 92)  BP: 142/87 (04 Feb 2023 04:51) (109/73 - 142/87)  BP(mean): --  RR: 18 (04 Feb 2023 04:51) (18 - 18)  SpO2: 94% (04 Feb 2023 04:51) (92% - 94%)    Parameters below as of 04 Feb 2023 04:51  Patient On (Oxygen Delivery Method): room air      PE:    General: NAD, resting comfortably in bed  Dressing C/D/I  Drain in place  negative avelar, clonus    Motor:                   C5                C6              C7               C8           T1   R            5/5                5/5            5/5             5/5          5/5  L             5/5               5/5             5/5             5/5          5/5                L2             L3             L4               L5            S1  R         5/5           5/5          5/5             5/5           5/5  L          5/5          5/5           5/5             5/5           5/5    Sensory:            C5         C6         C7      C8       T1        (0=absent, 1=impaired, 2=normal, NT=not testable)  R         2            2           2        2         2  L          2            2           2        2         2               L2          L3         L4      L5       S1         (0=absent, 1=impaired, 2=normal, NT=not testable)  R         2            2            2        2        2  L          2            2           2        2         2                              13.1   13.32 )-----------( 275      ( 03 Feb 2023 08:15 )             42.3       02-03    135  |  99  |  19  ----------------------------<  172<H>  4.1   |  30  |  1.20    Ca    9.1      03 Feb 2023 08:15        A/P:  52y M s/p L4-5 decompression POD 3    -PT/OT   -WBAT  -Pain Control  -DVT ppx with SCDs  -FU AM Labs  -Incentive Spirometry  -Medical comanagement appreciated  -dispo: home   - Plan to DC drain today and DC home with outpatient follow up  - Ortho stable

## 2023-02-04 NOTE — DIETITIAN INITIAL EVALUATION ADULT - OTHER INFO
History of Present Illness:  Reason for Admission: Lumbar Radiculopathy  History of Present Illness:   Patient is a 52yMale, who ambulates in the community with a cane, who presents to [facility] ED with progressive right lower extremity radicular pain, numbness, subjective weakness, and parethesias since a work-related accident in October   status post L4-5 posterior spinal decompression  weight stated 280# Ht 69"   patient seen by Diabetic educator possible metformin for home. education provided  microcytic anemia per MD note await FeSO supplement

## 2023-02-04 NOTE — DIETITIAN INITIAL EVALUATION ADULT - PERTINENT MEDS FT
MEDICATIONS  (STANDING):  acetaminophen     Tablet .. 650 milliGRAM(s) Oral every 6 hours  BUpivacaine liposome 1.3% Injectable 20 milliLiter(s) Local Injection once  dextrose 5%. 1000 milliLiter(s) (100 mL/Hr) IV Continuous <Continuous>  dextrose 5%. 1000 milliLiter(s) (50 mL/Hr) IV Continuous <Continuous>  dextrose 50% Injectable 25 Gram(s) IV Push once  dextrose 50% Injectable 12.5 Gram(s) IV Push once  dextrose 50% Injectable 25 Gram(s) IV Push once  glucagon  Injectable 1 milliGRAM(s) IntraMuscular once  influenza   Vaccine 0.5 milliLiter(s) IntraMuscular once  insulin lispro (ADMELOG) corrective regimen sliding scale   SubCutaneous three times a day before meals  insulin lispro (ADMELOG) corrective regimen sliding scale   SubCutaneous at bedtime  multivitamin 1 Tablet(s) Oral daily  pantoprazole    Tablet 40 milliGRAM(s) Oral before breakfast  senna 2 Tablet(s) Oral at bedtime    MEDICATIONS  (PRN):  cyclobenzaprine 10 milliGRAM(s) Oral every 8 hours PRN Muscle Spasm  dextrose Oral Gel 15 Gram(s) Oral once PRN Blood Glucose LESS THAN 70 milliGRAM(s)/deciliter  ondansetron   Disintegrating Tablet 4 milliGRAM(s) Oral every 6 hours PRN Nausea and/or Vomiting  oxyCODONE    IR 10 milliGRAM(s) Oral every 6 hours PRN Severe Pain (7 - 10)  oxyCODONE    IR 5 milliGRAM(s) Oral every 6 hours PRN Moderate Pain (4 - 6)  polyethylene glycol 3350 17 Gram(s) Oral daily PRN Constipation

## 2023-02-04 NOTE — PROGRESS NOTE ADULT - SUBJECTIVE AND OBJECTIVE BOX
[facility] -- Jessica Hawkins Pannella, Patel, Savella, Goodger: Office # [phone]    Follow Up:  Cardiac clearance     Subjective/Observations: Patient awake, alert, resting in bed. Denies chest pain, palpitations and dizziness. Denies any difficulty breathing. No orthopnea and PND. Tolerating room air. + ESTELLE drain on back. Pain controlled on meds.     REVIEW OF SYSTEMS: All other review of systems are negative unless indicated above    PAST MEDICAL & SURGICAL HISTORY:  Hypertension  CAP (community acquired pneumonia)  No significant past surgical history      MEDICATIONS  (STANDING):  acetaminophen     Tablet .. 650 milliGRAM(s) Oral every 6 hours  BUpivacaine liposome 1.3% Injectable 20 milliLiter(s) Local Injection once  dextrose 5%. 1000 milliLiter(s) (50 mL/Hr) IV Continuous <Continuous>  dextrose 5%. 1000 milliLiter(s) (100 mL/Hr) IV Continuous <Continuous>  dextrose 50% Injectable 25 Gram(s) IV Push once  dextrose 50% Injectable 12.5 Gram(s) IV Push once  dextrose 50% Injectable 25 Gram(s) IV Push once  glucagon  Injectable 1 milliGRAM(s) IntraMuscular once  influenza   Vaccine 0.5 milliLiter(s) IntraMuscular once  insulin lispro (ADMELOG) corrective regimen sliding scale   SubCutaneous three times a day before meals  insulin lispro (ADMELOG) corrective regimen sliding scale   SubCutaneous at bedtime  multivitamin 1 Tablet(s) Oral daily  pantoprazole    Tablet 40 milliGRAM(s) Oral before breakfast  senna 2 Tablet(s) Oral at bedtime    MEDICATIONS  (PRN):  cyclobenzaprine 10 milliGRAM(s) Oral every 8 hours PRN Muscle Spasm  dextrose Oral Gel 15 Gram(s) Oral once PRN Blood Glucose LESS THAN 70 milliGRAM(s)/deciliter  ondansetron   Disintegrating Tablet 4 milliGRAM(s) Oral every 6 hours PRN Nausea and/or Vomiting  oxyCODONE    IR 10 milliGRAM(s) Oral every 6 hours PRN Severe Pain (7 - 10)  oxyCODONE    IR 5 milliGRAM(s) Oral every 6 hours PRN Moderate Pain (4 - 6)  polyethylene glycol 3350 17 Gram(s) Oral daily PRN Constipation    Allergies  morphine (Unknown)  pain medicine, name unknown (Unknown)  penicillins (Unknown)    Vital Signs Last 24 Hrs  T(C): 37 (04 Feb 2023 04:51), Max: 37 (04 Feb 2023 04:51)  T(F): 98.6 (04 Feb 2023 04:51), Max: 98.6 (04 Feb 2023 04:51)  HR: 88 (04 Feb 2023 04:51) (83 - 89)  BP: 142/87 (04 Feb 2023 04:51) (109/73 - 142/87)  BP(mean): --  RR: 18 (04 Feb 2023 04:51) (18 - 18)  SpO2: 94% (04 Feb 2023 04:51) (92% - 94%)    Parameters below as of 04 Feb 2023 04:51  Patient On (Oxygen Delivery Method): room air      I&O's Summary    03 Feb 2023 07:01  -  04 Feb 2023 07:00  --------------------------------------------------------  IN: 0 mL / OUT: 40 mL / NET: -40 mL    04 Feb 2023 07:01  -  04 Feb 2023 10:44  --------------------------------------------------------  IN: 400 mL / OUT: 0 mL / NET: 400 mL      TELE: Not on telemetry   PHYSICAL EXAM:  Constitutional: NAD, awake and alert  HEENT: Moist Mucous Membranes, Anicteric  Pulmonary: Non-labored, breath sounds are clear bilaterally, No wheezing, rales or rhonchi  Cardiovascular: Regular, S1 and S2, No murmurs, No rubs, gallops or clicks  Gastrointestinal:  soft, nontender, nondistended   Lymph: No peripheral edema. No lymphadenopathy.   Skin: No visible rashes or ulcers. + ESTELLE drain   Psych:  Mood & affect appropriate      LABS: All Labs Reviewed:                        13.3   10.60 )-----------( 252      ( 04 Feb 2023 06:16 )             43.6                         13.1   13.32 )-----------( 275      ( 03 Feb 2023 08:15 )             42.3                         13.3   14.79 )-----------( 311      ( 02 Feb 2023 08:05 )             42.7     04 Feb 2023 06:16    136    |  101    |  15     ----------------------------<  168    4.1     |  28     |  0.95   03 Feb 2023 08:15    135    |  99     |  19     ----------------------------<  172    4.1     |  30     |  1.20   02 Feb 2023 08:05    131    |  95     |  15     ----------------------------<  265    4.9     |  26     |  1.30     Ca    9.3        04 Feb 2023 06:16  Ca    9.1        03 Feb 2023 08:15  Ca    8.9        02 Feb 2023 08:05      12 Lead ECG:   Ventricular Rate 76 BPM    Atrial Rate 76 BPM    P-R Interval 164 ms    QRS Duration 100 ms    Q-T Interval 360 ms    QTC Calculation(Bazett) 405 ms    P Axis 38 degrees    R Axis -4 degrees    T Axis 68 degrees    Diagnosis Line Normal sinus rhythm  Minimal voltage criteria for LVH, may be normal variant ( Willy product )  T wave abnormality, consider lateral ischemia  Abnormal ECG  No previous ECGs available  Confirmed by Chidi Lopez MD ([id number]) on 1/31/2023 4:05:19 PM (01-31-23 @ 14:21)  ACC: [id number] EXAM:  ECHO TTE WO CON COMP W DOPP   ORDERED BY:  LILIA BHANDARI     PROCEDURE DATE:  02/01/2023    INTERPRETATION:  INDICATION: Preop  Sonographer    Blood Pressure unavailable    Height 175 cm     Weight 127 kg       BSA   2.4 sq m    Dimensions:  LA 3.9       Normal Values: 2.0 - 4.0 cm  Ao 3.7        Normal Values: 2.0 - 3.8 cm  SEPTUM 1.7       Normal Values: 0.6 - 1.2 cm  PWT 0.9       Normal Values: 0.6 - 1.1 cm  LVIDd 5.7         Normal Values: 3.0 - 5.6 cm  LVIDs 4.0       Normal Values: 1.8 - 4.0 cm    OBSERVATIONS:  Technically difficult study  Mitral Valve: normal, trace physiologic MR.  Aortic Valve/Aorta: normal trileaflet aortic valve.  Tricuspid Valve: normal with trace TR.  Pulmonic Valve: Not well-visualized  Left Atrium: normal  Right Atrium: Not well-visualized  Left Ventricle: Low-normal left ventricular systolic function, estimated   LVEF of 50-55%. Basal septal hypertrophy is noted. Cannot rule out   segmental abnormalities  Right Ventricle: Not well-visualized  Pericardium: no significant pericardial effusion.    IMPRESSION:  Technically difficult study  Low-normal left ventricular systolic function, estimated LVEF of 50-55%.   Basal septal hypertrophy is noted  Right ventricle is not well-visualized  Normal trileaflet aortic valve, without AI.  Trace physiologic MR and TR.  No significant pericardial effusion.  --- End of Report ---    ROBERTA LUKE MD; Attending Cardiologist  This document has been electronicallysigned. Feb 1 2023  9:59AM

## 2023-02-04 NOTE — DIETITIAN INITIAL EVALUATION ADULT - SIGNS/SYMPTOMS
as evidenced by new Dx DM type 2 with elevated A1c and blood sugars in setting of obesity grade 3 BMI 41.3 , with estimated energy intake greater than needs

## 2023-02-04 NOTE — DIETITIAN INITIAL EVALUATION ADULT - LITERATURE/VIDEOS GIVEN
left with consistent cho heart healthy diet , portion control information left with RD name and phone #

## 2023-02-04 NOTE — CASE MANAGEMENT PROGRESS NOTE - NSCMPROGRESSNOTE_GEN_ALL_CORE
Discussed on rounds this am.  Pt anticipated for d/c home today, no skilled needs identified. CM confirmed rolling walker delivered to pts bedside.

## 2023-02-04 NOTE — PROGRESS NOTE ADULT - PROVIDER SPECIALTY LIST ADULT
Orthopedics
Orthopedics
Cardiology
Orthopedics
Cardiology
Hospitalist
Orthopedics
Cardiology
Cardiology
Hospitalist

## 2023-02-04 NOTE — PROGRESS NOTE ADULT - NUTRITIONAL ASSESSMENT
This patient has been assessed with a concern for Malnutrition and has been determined to have a diagnosis/diagnoses of Morbid obesity (BMI > 40).    This patient is being managed with:   Diet Regular-  Consistent Carbohydrate {Evening Snack}  DASH/TLC {Sodium & Cholesterol Restricted}  Entered: Feb 2 2023  7:33AM

## 2023-02-04 NOTE — PROGRESS NOTE ADULT - TIME BILLING
Note written by attending, see above.  Time spent: 51min. More than 50% of the visit was spent counseling the patient on medical condition - lumbar radiculopathy, surgical decompression, newly diagnosed diabetes mellitus and education on diabetes, anemia and suspicion of iron deficiency and importance of screening colonoscopy, suspicion of INESSA and rec outpt f/up.
Note written by attending, see above.  Time spent: 37min. More than 50% of the visit was spent counseling the patient on medical condition - lumbar radiculopathy, surgical decompression, newly diagnosed diabetes mellitus and education on diabetes, anemia and suspicion of iron deficiency and importance of screening colonoscopy, suspicion of INESSA and rec outpt f/up.
Time spent: 51min. More than 50% of the visit was spent counseling the patient on medical condition - lumbar radiculopathy, surgical decompression, newly diagnosed diabetes mellitus and education on diabetes, anemia and suspicion of iron deficiency and importance of screening colonoscopy, suspicion of INESSA and rec outpt f/up.
Note written by attending, see above.  Time spent: 51min. More than 50% of the visit was spent counseling the patient on medical condition - lumbar radiculopathy, surgical decompression, newly diagnosed diabetes mellitus and education on diabetes, anemia and suspicion of iron deficiency and importance of screening colonoscopy.

## 2023-02-04 NOTE — PROGRESS NOTE ADULT - NS ATTEND AMEND GEN_ALL_CORE FT
Tolerated OR with no cardiac complaints.  To follow closely while admitted.
Tolerated OR.  D/c planning per primary team.
I have personally seen and examined the patient in detail.  I have spoken to the provider regarding the assessment and plan of care.  I have made changes to the note accordingly.
remote back injury with progressive neurologic symptoms  abnormal ekg indeterminate age  seems like he has had a workup in the past with some cv testing but no results available despite extensive time spent trying to get records  may be lvh related noting hx of htn  had been active until a few months ago and was able to climb stairs  echo with low normal LV function with basal septal hypertrophy  tolerated surgery without issue

## 2023-02-04 NOTE — PROGRESS NOTE ADULT - ASSESSMENT
53yo M w/ PMH of HTN, CAP, morbid obesity, chronic diastolic CHF presents for surgery scheduled for 2/1 with Dr. Capone for L4-5 Posterior spinal decompression. Medicine consulted for pre-surgical optimization, now s/p L4-5 posterior spinal decompression POD#3.     Problem/Recommendation - 1:  ·  Problem: Lumbar radiculopathy, right.   ·  Recommendation: Pt presents with right sided lumbar radiculopathy and is planned for L4-5 posterior spinal decompression  - no cord compression or cauda equina symptoms or any other red flag signs  - f/up preop labs  - Hold chemical DVT prophylaxis per ortho protocol  - Dr. Capone (ortho spine), recs appreciated  - blanca-op Abx with vancomycin, drain has been removed and pt to be monitored off abx  - Dr. Lechuga group (cardio), recs appreciated     Problem/Recommendation - 2:  ·  Problem: Hypertension.   ·  Recommendation: c/w carvedilol, and spironolactone.  - can restart lisinopril     Problem/Recommendation - 3:  ·  Problem: newly diagnosed Type 2 DM.   ·  Recommendation: Blood glucose in low-mid 200s earlier in admission  - pt states he does not have a history of diabetes, but did not see physicians  - continue to monitor  - Hgb A1c of 7.5%  - diabetes education/teaching - consulted diabetes NP team, Weil, recs appreciated  - FSG q AC&HS post-operatively with low-dose lispro ISS for now  - on ISS as inpt  - can be discharged on metformin 500mg po daily (to titrate up as outpatient with PMD or endocrinologist.      Problem/Recommendation - 4:  ·  Problem: JUSTYNA  ·  Recommendation: Cr 1.5 on admission and improved to 0.95 with hydration  - likely was prerenal   - monitor BMP     Problem/Recommendation - 5:  ·  Problem: Microcytic anemia.   ·  Recommendation: Hgb 12.9, MCV 75.7  - AM iron, TIBC, ferritin, B12, Folate technically wnl but given the low MCV and the TIBC of 362, pt likely has some iron deficiency -- rec starting ferrous sulfate  - f/u CBC  - patient has never had a colonoscopy he was instructed to establish care with a GI doctor as an outpatient and schedule a screening colonoscopy within 1-2 months given his age and microcytic anemia with likely iron deficiency     Problem/Recommendation - 6:  ·  Problem: Pulmonary nodule.   ·  Recommendation: - Chest X ray- 6mm nodule of indeterminate etiology RIGHT lower zone periphery.  - No airspace consolidation.  - As no prior radiographs available for comparison follow-up chest radiographs in 6 months and one year to confirm stability or CT chest to confirm benignity.  - patient informed of findings and instructed to f/u with his PCP in 1 month to schedule repeat chest imaging, CT scan to ensure, stability of nodule. Discussed that importance of follow up to ensure nodule does not grow in size and to evaluate for potential for malignancy.     Problem/Recommendation - 7:  ·  Problem: Need for prophylactic measure.   ·  Recommendation: VTE ppx: Hold chemical prophylaxis per ortho spine, c/w SCDs

## 2023-06-26 NOTE — CHART NOTE - NSCHARTNOTEFT_GEN_A_CORE
Care d/w anesthesiology and ortho team.  There was some concern from anesthesiologist about mildly increased PT and INR.  However, pt is experiencing no signs or symptoms of bleeding, no BRBPR, no epistaxsis, and Hb is approximately the same as yesterday without a significant downtrend.  And repeat INR and PT are same, not uptrending.  The mild uptrend might be from slightly poorer oral intake, possibly of vitamin K, and given the minimal increase in value, would not preclude pt form going to OR.    Andrea Johansen, Attending Physician
Post Operative Note  Patient: NITA MILLER 52y (1970) Male   MRN: [id number]  Location: [address]  Visit: 01-31-23 Inpatient  Date: 02-02-23 @ 02:06    Procedure: S/P L4-L5 decompression.    Subjective:   Patient seen and examined at bedside,Pt currently sleeping.  VOiding comfortably.  Noted to have back pain.    Patient denies dizziness, chest pain, sob, nausea, vomiting, abdominal pain, or urinary complaints.    Objective:  Vitals: T(F): 98.3 (02-01-23 @ 21:43), Max: 98.3 (02-01-23 @ 21:43)  HR: 64 (02-01-23 @ 21:43)  BP: 138/81 (02-01-23 @ 21:43) (125/88 - 152/95)  RR: 18 (02-01-23 @ 21:43)  SpO2: 98% (02-01-23 @ 21:43)  Vent Settings:     In:   01-31-23 @ 07:01  -  02-01-23 @ 07:00  --------------------------------------------------------  IN: 75 mL    02-01-23 @ 07:01  -  02-02-23 @ 02:06  --------------------------------------------------------  IN: 840 mL      IV Fluids: multivitamin 1 Tablet(s) Oral daily      Out:   01-31-23 @ 07:01  -  02-01-23 @ 07:00  --------------------------------------------------------  OUT: 0 mL    02-01-23 @ 07:01  -  02-02-23 @ 02:06  --------------------------------------------------------  OUT: 1105 mL      EBL:     Voided Urine:   01-31-23 @ 07:01  -  02-01-23 @ 07:00  --------------------------------------------------------  OUT: 0 mL    02-01-23 @ 07:01  -  02-02-23 @ 02:06  --------------------------------------------------------  OUT: 1105 mL      Harris Catheter: yes no   Drains:   ESTELLE:   02-01-23 @ 07:01  -  02-02-23 @ 02:06  --------------------------------------------------------  OUT: 5 mL     ,   Chest Tube:      NG Tube:       GENERAL:  Well-nourished, well-developed Male lying comfortably in bed in NAD- currently sleeping.   HEENT:  NC/AT. Sclera white. Mucous membranes moist.  CARDIO:  Regular rate and rhythm.  No murmur, gallop or rub appreciated.  RESPIRATORY:  Nonlabored breathing, no accessory muscle use. Lungs clear to auscultation bilaterally.  No wheezing, rales or rhonchi appreciated.  ABDOMEN:  Soft, nondistended, nontender. BS appreciated on auscultation.  No rebound tenderness or guarding.  EXTREMITIES: No calf tenderness bilaterally. SCD in place.   SKIN:  No jaundice, pallor, or cyanosis  NEURO:  A&O x 3  Surgical dressings clean, dry, intact.    Medications: [Standing]  acetaminophen     Tablet .. 650 milliGRAM(s) Oral every 6 hours  BUpivacaine liposome 1.3% Injectable 20 milliLiter(s) Local Injection once  cyclobenzaprine 10 milliGRAM(s) Oral every 8 hours PRN  influenza   Vaccine 0.5 milliLiter(s) IntraMuscular once  multivitamin 1 Tablet(s) Oral daily  ondansetron   Disintegrating Tablet 4 milliGRAM(s) Oral every 6 hours PRN  oxyCODONE    IR 10 milliGRAM(s) Oral every 6 hours PRN  oxyCODONE    IR 5 milliGRAM(s) Oral every 6 hours PRN  pantoprazole    Tablet 40 milliGRAM(s) Oral before breakfast  polyethylene glycol 3350 17 Gram(s) Oral daily PRN  senna 2 Tablet(s) Oral at bedtime    Medications: [PRN]  acetaminophen     Tablet .. 650 milliGRAM(s) Oral every 6 hours  BUpivacaine liposome 1.3% Injectable 20 milliLiter(s) Local Injection once  cyclobenzaprine 10 milliGRAM(s) Oral every 8 hours PRN  influenza   Vaccine 0.5 milliLiter(s) IntraMuscular once  multivitamin 1 Tablet(s) Oral daily  ondansetron   Disintegrating Tablet 4 milliGRAM(s) Oral every 6 hours PRN  oxyCODONE    IR 10 milliGRAM(s) Oral every 6 hours PRN  oxyCODONE    IR 5 milliGRAM(s) Oral every 6 hours PRN  pantoprazole    Tablet 40 milliGRAM(s) Oral before breakfast  polyethylene glycol 3350 17 Gram(s) Oral daily PRN  senna 2 Tablet(s) Oral at bedtime    Labs:                        13.7   11.37 )-----------( 333      ( 01 Feb 2023 22:00 )             44.3     02-01    131<L>  |  96  |  16  ----------------------------<  271<H>  4.9   |  27  |  1.30    Ca    9.1      01 Feb 2023 22:00      PT/INR - ( 01 Feb 2023 13:10 )   PT: 13.9 sec;   INR: 1.19 ratio         PTT - ( 01 Feb 2023 05:25 )  PTT:32.6 sec      Imaging:  No post-op imaging studies    Assessment:  52 yo male here for L4-L5 decompression    Plan:   Incentive spirometer  ABX - 1 dose postoperative- VANCO  OOB  Ambulation  Regular diet  Analgesia prn  Anti emetics prn  Ortho to team to follow.
Anesthesia Volume In Cc: 2

## 2024-05-06 NOTE — DISCHARGE NOTE PROVIDER - NSDCDCMDCOMP_GEN_ALL_CORE
Patient comment: I will be traveling in June and would like to ensure continuity for this prescription. Thank you.     Rx request for    tadalafil (CIALIS) 5 MG tablet 90 tablet 0 3/11/2024 --    Sig - Route: Take 1 tablet by mouth daily. - Oral      LOV:3/4/24    NOV:8/5/24    Last pertinent labs:BP-130/74     This document is complete and the patient is ready for discharge.

## 2024-11-22 NOTE — PROGRESS NOTE ADULT - ASSESSMENT
in progress 52M with PMHx CAP, obesity , chronic diastolic CHF, HTN presenting for  L4-L5 posterior spinal decompression.    Cardiac clearance  - pt with  L4-L5 posterior spinal decompression s/p surgery 2/1. tolerated procedure well from cv standpoint  - ortho following, reports back pain is improved, + juan drain    - EKG:SR 76bpm with TWI lateral. No prior EKG for comparison   - Denies anginal complaints  - per patient he follows with a cardiologist (he cannot remember the name) in  [facility], called and attempted to get records from [facility] unable to secure OSH records   - continue statin     - TTE(2/1/2023)Low-normal left ventricular systolic function, estimated LVEF of 50-55%.   - No sign of volume overload, lying flat in bed, on room air  - CXR: no effusion or pneumothorax     - BP, HR stable and controlled  - continue home medications Coreg, Ace, Aldactone    - Dc planning per primary team  - Monitor and replete lytes, keep K>4, Mg>2.  - Will continue to follow.    Teja Eldridge NP  Nurse Practitioner- Cardiology   Spectra #[phone]   No

## (undated) DEVICE — DRAPE 3/4 SHEET W REINFORCEMENT 56X77"

## (undated) DEVICE — BLADE SCALPEL SAFETYLOCK #15

## (undated) DEVICE — MARKING PEN W RULER

## (undated) DEVICE — ELCTR BOVIE TIP BLADE INSULATED 4" EDGE

## (undated) DEVICE — DRAPE IOBAN 13" X 13"

## (undated) DEVICE — Device

## (undated) DEVICE — DRSG CURITY GAUZE SPONGE 4 X 4" 12-PLY

## (undated) DEVICE — PLV-SCD MACHINE: Type: DURABLE MEDICAL EQUIPMENT

## (undated) DEVICE — SUT SOFSILK 2-0 18" C-15

## (undated) DEVICE — POSITIONER JACKSON TABLE CHEST, HIP, THIGH PADS, ARM CRADLE

## (undated) DEVICE — VENODYNE/SCD SLEEVE CALF LARGE

## (undated) DEVICE — MIDAS REX LEGEND MATCH HEAD FLUTED SM BORE 3.0MM X 10CM

## (undated) DEVICE — DRAPE MAYO STAND 23"

## (undated) DEVICE — WARMING BLANKET LOWER ADULT

## (undated) DEVICE — DRAPE C ARM UNIVERSAL

## (undated) DEVICE — SPECIMEN CONTAINER 4OZ

## (undated) DEVICE — DRSG STERISTRIPS 0.5 X 4"

## (undated) DEVICE — GLV 8 DERMAPRENE ULTRA

## (undated) DEVICE — GOWN XL W TOWEL

## (undated) DEVICE — POSITIONER CUSHION INSERT PRONE VIEW LG

## (undated) DEVICE — DRSG TEGADERM 2.5X3"

## (undated) DEVICE — SUT POLYSORB 2-0 30" C-14 UNDYED

## (undated) DEVICE — POSITIONER JACKSON TABLE PRONE CUSHION, TORSO COVER, HIP/THIGH PADS, ARM CRADLES

## (undated) DEVICE — DRSG XEROFORM 5 X 9"

## (undated) DEVICE — DRSG TAPE MICROPORE 3"

## (undated) DEVICE — BLADE SCALPEL SAFETYLOCK #10

## (undated) DEVICE — DRSG MASTISOL

## (undated) DEVICE — STAPLER SKIN VISI-STAT 35 WIDE

## (undated) DEVICE — PREP DURAPREP 26CC

## (undated) DEVICE — POSITIONER FOAM LAMINECTOMY ARM CRADLE (PINK)

## (undated) DEVICE — DRAPE SURGICAL #1010

## (undated) DEVICE — SUT VICRYL 2-0 18" CT-1 UNDYED (POP-OFF)

## (undated) DEVICE — VENODYNE/SCD SLEEVE CALF MEDIUM

## (undated) DEVICE — GLV 7.5 PROTEXIS (WHITE)

## (undated) DEVICE — MIDAS REX LEGEND LUBRICANT DIFFUSER CARTRIDGE

## (undated) DEVICE — PREP ALCOHOL PAD MED

## (undated) DEVICE — PACK MINOR WITH LAP

## (undated) DEVICE — DRAPE INSTRUMENT POUCH 7" X 12"

## (undated) DEVICE — ELCTR BOVIE TIP BLADE INSULATED 2.75" EDGE

## (undated) DEVICE — ELCTR BIPOLAR CORD J&J 12FT DISP

## (undated) DEVICE — DRAPE TOWEL BLUE 17" X 24"

## (undated) DEVICE — DRAIN JACKSON PRATT 10MM FLAT FULL NO TROCAR

## (undated) DEVICE — DRAIN RESERVOIR FOR JACKSON PRATT 100CC CARDINAL

## (undated) DEVICE — PLV/PSP-ESU T7E14761DX: Type: DURABLE MEDICAL EQUIPMENT